# Patient Record
Sex: MALE | Race: WHITE | Employment: UNEMPLOYED | URBAN - METROPOLITAN AREA
[De-identification: names, ages, dates, MRNs, and addresses within clinical notes are randomized per-mention and may not be internally consistent; named-entity substitution may affect disease eponyms.]

---

## 2018-03-09 ENCOUNTER — HOSPITAL ENCOUNTER (EMERGENCY)
Age: 23
Discharge: HOME | End: 2018-03-09
Attending: EMERGENCY MEDICINE
Payer: SELF-PAY

## 2018-03-09 VITALS
BODY MASS INDEX: 29.2 KG/M2 | WEIGHT: 192.68 LBS | RESPIRATION RATE: 14 BRPM | TEMPERATURE: 97.8 F | HEIGHT: 68 IN | DIASTOLIC BLOOD PRESSURE: 76 MMHG | SYSTOLIC BLOOD PRESSURE: 111 MMHG | OXYGEN SATURATION: 100 % | HEART RATE: 102 BPM

## 2018-03-09 DIAGNOSIS — L02.31 ABSCESS OF BUTTOCK, LEFT: Primary | ICD-10-CM

## 2018-03-09 PROCEDURE — 99283 EMERGENCY DEPT VISIT LOW MDM: CPT

## 2018-03-09 PROCEDURE — 74011250637 HC RX REV CODE- 250/637: Performed by: EMERGENCY MEDICINE

## 2018-03-09 RX ORDER — OXYCODONE AND ACETAMINOPHEN 5; 325 MG/1; MG/1
1 TABLET ORAL
Qty: 15 TAB | Refills: 0 | Status: SHIPPED | OUTPATIENT
Start: 2018-03-09 | End: 2021-09-05

## 2018-03-09 RX ORDER — OXYCODONE AND ACETAMINOPHEN 5; 325 MG/1; MG/1
2 TABLET ORAL
Status: COMPLETED | OUTPATIENT
Start: 2018-03-09 | End: 2018-03-09

## 2018-03-09 RX ORDER — DOXYCYCLINE HYCLATE 100 MG
100 TABLET ORAL 2 TIMES DAILY
Qty: 20 TAB | Refills: 0 | Status: SHIPPED | OUTPATIENT
Start: 2018-03-09 | End: 2018-03-09

## 2018-03-09 RX ORDER — DOXYCYCLINE HYCLATE 100 MG
100 TABLET ORAL 2 TIMES DAILY
Qty: 20 TAB | Refills: 0 | Status: SHIPPED | OUTPATIENT
Start: 2018-03-09 | End: 2018-03-19

## 2018-03-09 RX ADMIN — OXYCODONE HYDROCHLORIDE AND ACETAMINOPHEN 2 TABLET: 5; 325 TABLET ORAL at 03:37

## 2018-03-09 NOTE — ED NOTES
______Era__________________ in to talk with patient and explain plan of care with  understanding and  written & verbal instructions.

## 2018-03-09 NOTE — ED NOTES
Patient arrives ambulatory to ED with girlfriend with a report of an abscess to buttocks and \"bug bites\" all over mostly down legs and inner thighs. Patient reports this started about 1-2 weeks ago.

## 2018-03-09 NOTE — ED PROVIDER NOTES
EMERGENCY DEPARTMENT HISTORY AND PHYSICAL EXAM      Date: 3/9/2018  Patient Name: Mily Barreto    History of Presenting Illness     Chief Complaint   Patient presents with    Abscess     Patient has an abscess (hx of MRSA) on the left side of his buttocks that recurrently breaks open and pus mixed with blood comes out. He thinks he is getting bug bites in his house (does not know if it is bed bugs or some other bug). He has not seen any and no one else in the house is getting bit. History Provided By: Patient    HPI: Mily Barreto, 25 y.o. male with PMHx significant for asthma and MRSA, presents ambulatory to the ED for further evaluation of an abscess to the left buttock noticed 1.5 weeks ago. The pt reports that over the last 2-3 days the wound has popped and been draining purulent and bloody material. He expresses that he believes he was bit by bugs and notes that he has numerous bites to his BL legs and buttock region. He discloses a h/o MRSA. The pt expresses that he has been using Neosporin and covering the wound with a band-aid WNR of his sx leading him to the ED. He denies any fevers, chills, chest pain, SOB, abdominal pain, nausea, vomiting, or diarrhea. PCP: Ling Wetzel MD    There are no other complaints, changes, or physical findings at this time.         Past History     Past Medical History:  Past Medical History:   Diagnosis Date    Asthma     BMI (body mass index), pediatric, 95-99% for age 7/17/2013    Hx MRSA infection     Otitis media     [facility]al meningitis 1996    [facility]       Past Surgical History:  Past Surgical History:   Procedure Laterality Date    HX CIRCUMCISION      HX WISDOM TEETH EXTRACTION  2013       Family History:  Family History   Problem Relation Age of Onset    Diabetes Mother     Hypertension Maternal Grandmother     Cancer Maternal Grandmother      breast    Heart Disease Maternal Grandmother        Social History:  Social History   Substance Use Topics    Smoking status: Not on file    Smokeless tobacco: Not on file    Alcohol use Not on file       Allergies: Allergies   Allergen Reactions    Amoxicillin Swelling    Amoxicillin Hives    Codeine Swelling    Codeine Swelling    Sulfa (Sulfonamide Antibiotics) Hives     Happened when pt was an infant         Review of Systems   Review of Systems   Constitutional: Negative for activity change, chills and fever. HENT: Negative for congestion and sore throat. Eyes: Negative for pain and redness. Respiratory: Negative for cough, chest tightness and shortness of breath. Cardiovascular: Negative for chest pain and palpitations. Gastrointestinal: Negative for abdominal pain, diarrhea, nausea and vomiting. Genitourinary: Negative for dysuria, frequency and urgency. Musculoskeletal: Negative for back pain and neck pain. Skin: Positive for wound (abscess to right buttock ). Negative for rash. Neurological: Negative for syncope, light-headedness and headaches. Psychiatric/Behavioral: Negative for confusion. All other systems reviewed and are negative. Physical Exam   Physical Exam   Constitutional: He is oriented to person, place, and time. He appears well-developed and well-nourished. No distress. HENT:   Head: Normocephalic. Nose: Nose normal.   Mouth/Throat: Oropharynx is clear and moist. No oropharyngeal exudate. Eyes: Conjunctivae are normal. Pupils are equal, round, and reactive to light. No scleral icterus. Neck: Normal range of motion. Neck supple. No JVD present. No tracheal deviation present. No thyromegaly present. Cardiovascular: Normal rate, regular rhythm and intact distal pulses. Exam reveals no gallop and no friction rub. No murmur heard. Pulmonary/Chest: Effort normal and breath sounds normal. No stridor. No respiratory distress. He has no wheezes. He has no rales. Abdominal: Soft. Bowel sounds are normal. He exhibits no distension.  There is no tenderness. There is no rebound and no guarding. Musculoskeletal: Normal range of motion. He exhibits no edema. Lymphadenopathy:     He has no cervical adenopathy. Neurological: He is alert and oriented to person, place, and time. No cranial nerve deficit. He exhibits normal muscle tone. Coordination normal.   Skin: Skin is warm and dry. No rash noted. He is not diaphoretic. No erythema. 4cm in diameter swollen area on the superior right buttock just lateral to the buttock crease with a 3mm central open area    Psychiatric: He has a normal mood and affect. His behavior is normal.   Nursing note and vitals reviewed. Diagnostic Study Results       Medical Decision Making   I am the first provider for this patient. I reviewed the vital signs, available nursing notes, past medical history, past surgical history, family history and social history. Vital Signs-Reviewed the patient's vital signs. Patient Vitals for the past 12 hrs:   Temp Pulse Resp BP SpO2   03/09/18 0249 97.8 °F (36.6 °C) (!) 102 14 111/76 100 %       Pulse Oximetry Analysis - 100% on room air    Cardiac Monitor:   Rate: 102 bpm  Rhythm: Sinus Tachycardia        Records Reviewed: Nursing Notes and Old Medical Records    Provider Notes (Medical Decision Making):     DDx: Abscess, Cellulitis. ED Course:   Initial assessment performed. The patients presenting problems have been discussed, and they are in agreement with the care plan formulated and outlined with them. I have encouraged them to ask questions as they arise throughout their visit. Progress Notes:    Procedure Note - Incision and Drainage:   2:56 AM  Performed by: Gina Dempsey MD  Complexity: simple  Direct pressure was applied to abscess to buttocks and 10mLs of yellow-purulent drainage was expressed. Wound probed and irrigated. Area was no packed. Sterile dressing applied. Estimated blood loss: 10 cc  The procedure took 1-15 minutes, and pt tolerated well. Critical Care Time: 0 minutes    Disposition:  Discharge Note:  3:02 AM  The patient is ready for discharge. The patient's signs, symptoms, diagnosis, and discharge instruction have been discussed and the patient has conveyed their understanding. The patient is to follow up as recommended or return to the ER should their symptoms worsen. Plan has been discussed and the patient is in agreement. Written by Flavia Lora ED Scribe, as dictated by Kike Palma MD    Pt well appearing; afebrile; abscess already open and draining; purulent drainage expressed with direct pressure; dc with doxy; pt agreed to return if any worsening pain or swelling, fevers, n/v. Kike Palma MD        PLAN:  1. Current Discharge Medication List      START taking these medications    Details   oxyCODONE-acetaminophen (PERCOCET) 5-325 mg per tablet Take 1 Tab by mouth every four (4) hours as needed for Pain. Max Daily Amount: 6 Tabs. Qty: 15 Tab, Refills: 0    Associated Diagnoses: Abscess of buttock, left      doxycycline (VIBRA-TABS) 100 mg tablet Take 1 Tab by mouth two (2) times a day for 10 days. Qty: 20 Tab, Refills: 0           2. Follow-up Information     Follow up With Details Comments Contact Info    Mario Jenkins MD Schedule an appointment as soon as possible for a visit in 1 day If symptoms worsen [address] [address] [phone] [address] [place] [address] EMERGENCY DEPT Go in 1 day If symptoms worsen [address] [place] [address]  [phone]        Return to ED if worse     Diagnosis     Clinical Impression:   1. Abscess of buttock, left        Attestations:    Attestation: This note is prepared by Robin Lora, acting as Scribe for Kike Palma MD.      Kike Palma MD: The scribe's documentation has been prepared under my direction and personally reviewed by me in its entirety.  I confirm that the note above accurately reflects all work, treatment, procedures, and medical decision making performed by me.

## 2021-04-07 ENCOUNTER — APPOINTMENT (OUTPATIENT)
Dept: GENERAL RADIOLOGY | Age: 26
End: 2021-04-07
Attending: EMERGENCY MEDICINE
Payer: COMMERCIAL

## 2021-04-07 ENCOUNTER — HOSPITAL ENCOUNTER (EMERGENCY)
Age: 26
Discharge: HOME | End: 2021-04-07
Attending: EMERGENCY MEDICINE
Payer: COMMERCIAL

## 2021-04-07 VITALS
TEMPERATURE: 98.9 F | HEART RATE: 93 BPM | OXYGEN SATURATION: 98 % | SYSTOLIC BLOOD PRESSURE: 128 MMHG | DIASTOLIC BLOOD PRESSURE: 74 MMHG | RESPIRATION RATE: 16 BRPM | BODY MASS INDEX: 27.28 KG/M2 | HEIGHT: 68 IN | WEIGHT: 180 LBS

## 2021-04-07 DIAGNOSIS — S39.012A BACK STRAIN, INITIAL ENCOUNTER: ICD-10-CM
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
## 2021-04-07 DIAGNOSIS — S16.1XXA STRAIN OF NECK MUSCLE, INITIAL ENCOUNTER: ICD-10-CM

## 2021-04-07 PROCEDURE — 99283 EMERGENCY DEPT VISIT LOW MDM: CPT

## 2021-04-07 PROCEDURE — 74011250636 HC RX REV CODE- 250/636: Performed by: EMERGENCY MEDICINE

## 2021-04-07 PROCEDURE — 96372 THER/PROPH/DIAG INJ SC/IM: CPT

## 2021-04-07 PROCEDURE — 74011250637 HC RX REV CODE- 250/637: Performed by: EMERGENCY MEDICINE

## 2021-04-07 PROCEDURE — 71045 X-RAY EXAM CHEST 1 VIEW: CPT

## 2021-04-07 RX ORDER — DIAZEPAM 5 MG/1
5 TABLET ORAL
Status: COMPLETED | OUTPATIENT
Start: 2021-04-07 | End: 2021-04-07

## 2021-04-07 RX ORDER — CYCLOBENZAPRINE HCL 10 MG
10 TABLET ORAL
Qty: 20 TAB | Refills: 0 | Status: SHIPPED | OUTPATIENT
Start: 2021-04-07 | End: 2021-09-05

## 2021-04-07 RX ORDER — IBUPROFEN 800 MG/1
800 TABLET ORAL
Qty: 20 TAB | Refills: 0 | Status: SHIPPED | OUTPATIENT
Start: 2021-04-07 | End: 2021-09-05

## 2021-04-07 RX ORDER — KETOROLAC TROMETHAMINE 30 MG/ML
30 INJECTION, SOLUTION INTRAMUSCULAR; INTRAVENOUS
Status: COMPLETED | OUTPATIENT
Start: 2021-04-07 | End: 2021-04-07

## 2021-04-07 RX ADMIN — KETOROLAC TROMETHAMINE 30 MG: 30 INJECTION, SOLUTION INTRAMUSCULAR; INTRAVENOUS at 04:35

## 2021-04-07 RX ADMIN — DIAZEPAM 5 MG: 5 TABLET ORAL at 04:34
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
## 2021-09-03 ENCOUNTER — APPOINTMENT (OUTPATIENT)
Dept: GENERAL RADIOLOGY | Age: 26
DRG: 872 | End: 2021-09-03
Attending: EMERGENCY MEDICINE
Payer: COMMERCIAL

## 2021-09-03 ENCOUNTER — HOSPITAL ENCOUNTER (EMERGENCY)
Age: 26
Discharge: HOME | DRG: 872 | End: 2021-09-03
Attending: EMERGENCY MEDICINE
Payer: COMMERCIAL

## 2021-09-03 VITALS
WEIGHT: 185 LBS | OXYGEN SATURATION: 98 % | SYSTOLIC BLOOD PRESSURE: 116 MMHG | HEART RATE: 92 BPM | TEMPERATURE: 100.3 F | BODY MASS INDEX: 28.13 KG/M2 | DIASTOLIC BLOOD PRESSURE: 64 MMHG | RESPIRATION RATE: 16 BRPM

## 2021-09-03 DIAGNOSIS — N30.00 ACUTE CYSTITIS WITHOUT HEMATURIA: ICD-10-CM

## 2021-09-03 DIAGNOSIS — R11.2 NON-INTRACTABLE VOMITING WITH NAUSEA, UNSPECIFIED VOMITING TYPE: ICD-10-CM

## 2021-09-03 DIAGNOSIS — F11.93 HEROIN WITHDRAWAL (HCC): Primary | ICD-10-CM

## 2021-09-03 DIAGNOSIS — R50.9 ACUTE FEBRILE ILLNESS: ICD-10-CM

## 2021-09-03 LAB
ALBUMIN SERPL-MCNC: 2.4 G/DL (ref 3.5–5)
ALBUMIN/GLOB SERPL: 0.6 {RATIO} (ref 1.1–2.2)
ALP SERPL-CCNC: 74 U/L (ref 45–117)
ALT SERPL-CCNC: 68 U/L (ref 12–78)
AMPHET UR QL SCN: NEGATIVE
ANION GAP SERPL CALC-SCNC: 7 MMOL/L (ref 5–15)
APPEARANCE UR: CLEAR
AST SERPL-CCNC: 57 U/L (ref 15–37)
BACTERIA URNS QL MICRO: NEGATIVE /HPF
BARBITURATES UR QL SCN: NEGATIVE
BASOPHILS # BLD: 0 K/UL (ref 0–0.1)
BASOPHILS NFR BLD: 0 % (ref 0–1)
BENZODIAZ UR QL: NEGATIVE
BILIRUB SERPL-MCNC: 0.9 MG/DL (ref 0.2–1)
BILIRUB UR QL: NEGATIVE
BUN SERPL-MCNC: 7 MG/DL (ref 6–20)
BUN/CREAT SERPL: 7 (ref 12–20)
CALCIUM SERPL-MCNC: 8.3 MG/DL (ref 8.5–10.1)
CANNABINOIDS UR QL SCN: NEGATIVE
CHLORIDE SERPL-SCNC: 92 MMOL/L (ref 97–108)
CO2 SERPL-SCNC: 31 MMOL/L (ref 21–32)
COCAINE UR QL SCN: POSITIVE
COLOR UR: ABNORMAL
CREAT SERPL-MCNC: 1.04 MG/DL (ref 0.7–1.3)
DIFFERENTIAL METHOD BLD: ABNORMAL
DRUG SCRN COMMENT,DRGCM: ABNORMAL
EOSINOPHIL # BLD: 0 K/UL (ref 0–0.4)
EOSINOPHIL NFR BLD: 0 % (ref 0–7)
EPITH CASTS URNS QL MICRO: ABNORMAL /LPF
ERYTHROCYTE [DISTWIDTH] IN BLOOD BY AUTOMATED COUNT: 13.5 % (ref 11.5–14.5)
GLOBULIN SER CALC-MCNC: 4.3 G/DL (ref 2–4)
GLUCOSE SERPL-MCNC: 184 MG/DL (ref 65–100)
GLUCOSE UR STRIP.AUTO-MCNC: NEGATIVE MG/DL
HCT VFR BLD AUTO: 34.5 % (ref 36.6–50.3)
HGB BLD-MCNC: 11.3 G/DL (ref 12.1–17)
HGB UR QL STRIP: ABNORMAL
IMM GRANULOCYTES # BLD AUTO: 0 K/UL
IMM GRANULOCYTES NFR BLD AUTO: 0 %
KETONES UR QL STRIP.AUTO: NEGATIVE MG/DL
LACTATE SERPL-SCNC: 2.6 MMOL/L (ref 0.4–2)
LEUKOCYTE ESTERASE UR QL STRIP.AUTO: ABNORMAL
LYMPHOCYTES # BLD: 1.3 K/UL (ref 0.8–3.5)
LYMPHOCYTES NFR BLD: 10 % (ref 12–49)
MCH RBC QN AUTO: 27.4 PG (ref 26–34)
MCHC RBC AUTO-ENTMCNC: 32.8 G/DL (ref 30–36.5)
MCV RBC AUTO: 83.7 FL (ref 80–99)
METHADONE UR QL: NEGATIVE
MONOCYTES # BLD: 1.7 K/UL (ref 0–1)
MONOCYTES NFR BLD: 13 % (ref 5–13)
MUCOUS THREADS URNS QL MICRO: ABNORMAL /LPF
NEUTS BAND NFR BLD MANUAL: 5 % (ref 0–6)
NEUTS SEG # BLD: 10 K/UL (ref 1.8–8)
NEUTS SEG NFR BLD: 72 % (ref 32–75)
NITRITE UR QL STRIP.AUTO: NEGATIVE
NRBC # BLD: 0 K/UL (ref 0–0.01)
NRBC BLD-RTO: 0 PER 100 WBC
OPIATES UR QL: NEGATIVE
PCP UR QL: NEGATIVE
PH UR STRIP: 6.5 [PH] (ref 5–8)
PLATELET # BLD AUTO: 155 K/UL (ref 150–400)
PMV BLD AUTO: 10 FL (ref 8.9–12.9)
POTASSIUM SERPL-SCNC: 3.4 MMOL/L (ref 3.5–5.1)
PROT SERPL-MCNC: 6.7 G/DL (ref 6.4–8.2)
PROT UR STRIP-MCNC: 30 MG/DL
RBC # BLD AUTO: 4.12 M/UL (ref 4.1–5.7)
RBC #/AREA URNS HPF: ABNORMAL /HPF (ref 0–5)
RBC MORPH BLD: ABNORMAL
SODIUM SERPL-SCNC: 130 MMOL/L (ref 136–145)
SP GR UR REFRACTOMETRY: 1.01 (ref 1–1.03)
TROPONIN I SERPL-MCNC: <0.05 NG/ML
UA: UC IF INDICATED,UAUC: ABNORMAL
UROBILINOGEN UR QL STRIP.AUTO: 4 EU/DL (ref 0.2–1)
WBC # BLD AUTO: 13 K/UL (ref 4.1–11.1)
WBC URNS QL MICRO: ABNORMAL /HPF (ref 0–4)

## 2021-09-03 PROCEDURE — 71045 X-RAY EXAM CHEST 1 VIEW: CPT

## 2021-09-03 PROCEDURE — 36415 COLL VENOUS BLD VENIPUNCTURE: CPT

## 2021-09-03 PROCEDURE — 80053 COMPREHEN METABOLIC PANEL: CPT

## 2021-09-03 PROCEDURE — U0003 INFECTIOUS AGENT DETECTION BY NUCLEIC ACID (DNA OR RNA); SEVERE ACUTE RESPIRATORY SYNDROME CORONAVIRUS 2 (SARS-COV-2) (CORONAVIRUS DISEASE [COVID-19]), AMPLIFIED PROBE TECHNIQUE, MAKING USE OF HIGH THROUGHPUT TECHNOLOGIES AS DESCRIBED BY CMS-2020-01-R: HCPCS

## 2021-09-03 PROCEDURE — 81001 URINALYSIS AUTO W/SCOPE: CPT

## 2021-09-03 PROCEDURE — 87186 SC STD MICRODIL/AGAR DIL: CPT

## 2021-09-03 PROCEDURE — 96375 TX/PRO/DX INJ NEW DRUG ADDON: CPT

## 2021-09-03 PROCEDURE — 85025 COMPLETE CBC W/AUTO DIFF WBC: CPT

## 2021-09-03 PROCEDURE — 96365 THER/PROPH/DIAG IV INF INIT: CPT

## 2021-09-03 PROCEDURE — 87077 CULTURE AEROBIC IDENTIFY: CPT

## 2021-09-03 PROCEDURE — 99285 EMERGENCY DEPT VISIT HI MDM: CPT

## 2021-09-03 PROCEDURE — 80307 DRUG TEST PRSMV CHEM ANLYZR: CPT

## 2021-09-03 PROCEDURE — 84484 ASSAY OF TROPONIN QUANT: CPT

## 2021-09-03 PROCEDURE — 83605 ASSAY OF LACTIC ACID: CPT

## 2021-09-03 PROCEDURE — 74011250636 HC RX REV CODE- 250/636: Performed by: EMERGENCY MEDICINE

## 2021-09-03 PROCEDURE — 87086 URINE CULTURE/COLONY COUNT: CPT

## 2021-09-03 PROCEDURE — 93005 ELECTROCARDIOGRAM TRACING: CPT

## 2021-09-03 PROCEDURE — 74011250637 HC RX REV CODE- 250/637: Performed by: EMERGENCY MEDICINE

## 2021-09-03 RX ORDER — KETOROLAC TROMETHAMINE 30 MG/ML
15 INJECTION, SOLUTION INTRAMUSCULAR; INTRAVENOUS
Status: COMPLETED | OUTPATIENT
Start: 2021-09-03 | End: 2021-09-03

## 2021-09-03 RX ORDER — ONDANSETRON 4 MG/1
4 TABLET, ORALLY DISINTEGRATING ORAL
Qty: 10 TABLET | Refills: 0 | Status: SHIPPED | OUTPATIENT
Start: 2021-09-03 | End: 2021-09-05

## 2021-09-03 RX ORDER — CIPROFLOXACIN 500 MG/1
500 TABLET ORAL 2 TIMES DAILY
Qty: 14 TABLET | Refills: 0 | Status: SHIPPED | OUTPATIENT
Start: 2021-09-03 | End: 2021-09-10

## 2021-09-03 RX ORDER — LEVOFLOXACIN 5 MG/ML
500 INJECTION, SOLUTION INTRAVENOUS
Status: COMPLETED | OUTPATIENT
Start: 2021-09-03 | End: 2021-09-03

## 2021-09-03 RX ORDER — ONDANSETRON 2 MG/ML
4 INJECTION INTRAMUSCULAR; INTRAVENOUS
Status: COMPLETED | OUTPATIENT
Start: 2021-09-03 | End: 2021-09-03

## 2021-09-03 RX ORDER — POTASSIUM CHLORIDE 750 MG/1
40 TABLET, FILM COATED, EXTENDED RELEASE ORAL
Status: COMPLETED | OUTPATIENT
Start: 2021-09-03 | End: 2021-09-03

## 2021-09-03 RX ORDER — SODIUM CHLORIDE 0.9 % (FLUSH) 0.9 %
5-40 SYRINGE (ML) INJECTION EVERY 8 HOURS
Status: DISCONTINUED | OUTPATIENT
Start: 2021-09-03 | End: 2021-09-03 | Stop reason: HOSPADM

## 2021-09-03 RX ADMIN — SODIUM CHLORIDE 1000 ML: 9 INJECTION, SOLUTION INTRAVENOUS at 17:34

## 2021-09-03 RX ADMIN — SODIUM CHLORIDE 1000 ML: 9 INJECTION, SOLUTION INTRAVENOUS at 16:45

## 2021-09-03 RX ADMIN — Medication 10 ML: at 17:51

## 2021-09-03 RX ADMIN — KETOROLAC TROMETHAMINE 15 MG: 30 INJECTION, SOLUTION INTRAMUSCULAR; INTRAVENOUS at 16:43

## 2021-09-03 RX ADMIN — POTASSIUM CHLORIDE 40 MEQ: 750 TABLET, EXTENDED RELEASE ORAL at 17:55

## 2021-09-03 RX ADMIN — ONDANSETRON 4 MG: 2 INJECTION INTRAMUSCULAR; INTRAVENOUS at 16:40

## 2021-09-03 RX ADMIN — LEVOFLOXACIN 500 MG: 5 INJECTION, SOLUTION INTRAVENOUS at 17:36

## 2021-09-03 NOTE — ED PROVIDER NOTES
EMERGENCY DEPARTMENT HISTORY AND PHYSICAL EXAM      Date: 9/3/2021  Patient Name: Gwen Greene    History of Presenting Illness     Chief Complaint   Patient presents with    Vomiting       History Provided By: Patient    HPI: Gwen Greene, 23 y.o. male with PMHx significant for asthma, presents ambulatory to the ED with cc of mild to moderate body aches, chest pain, SOB, NV and dry cough x a week. Pt reports decreased appetite. Reports body aches mostly in his thighs. States chest pain radiates to R shoulder. Denies any alleviating or exacerbating factors. Reports heroin use, last used a few days ago, denies alcohol use. Has not received COVID vaccine and no known COVID exposure. There are no other complaints, changes, or physical findings at this time. PCP: Mila Lu MD    No current facility-administered medications on file prior to encounter. Current Outpatient Medications on File Prior to Encounter   Medication Sig Dispense Refill    ibuprofen (MOTRIN) 800 mg tablet Take 1 Tab by mouth every six (6) hours as needed for Pain. (Patient not taking: Reported on 9/3/2021) 20 Tab 0    cyclobenzaprine (FLEXERIL) 10 mg tablet Take 1 Tab by mouth three (3) times daily as needed for Muscle Spasm(s). (Patient not taking: Reported on 9/3/2021) 20 Tab 0    oxyCODONE-acetaminophen (PERCOCET) 5-325 mg per tablet Take 1 Tab by mouth every four (4) hours as needed for Pain. Max Daily Amount: 6 Tabs.  (Patient not taking: Reported on 9/3/2021) 15 Tab 0       Past History     Past Medical History:  Past Medical History:   Diagnosis Date    Asthma     BMI (body mass index), pediatric, 95-99% for age 7/17/2013    BMI (body mass index), pediatric, 95-99% for age    Alexandra Latin Hx MRSA infection     Otitis media     [facility] 1996    [facility]       Past Surgical History:  Past Surgical History:   Procedure Laterality Date    HX CIRCUMCISION      HX WISDOM TEETH EXTRACTION  2013       Family History:  Family History   Problem Relation Age of Onset    Diabetes Mother     Hypertension Maternal Grandmother     Cancer Maternal Grandmother         breast    Heart Disease Maternal Grandmother        Social History:  Social History     Tobacco Use    Smoking status: Current Every Day Smoker     Packs/day: 0.50    Smokeless tobacco: Never Used   Substance Use Topics    Alcohol use: Not Currently    Drug use: Yes     Types: Heroin       Allergies: Allergies   Allergen Reactions    Amoxicillin Swelling    Amoxicillin Hives    Codeine Swelling    Codeine Swelling    Sulfa (Sulfonamide Antibiotics) Hives     Happened when pt was an infant         Review of Systems   Review of Systems   Constitutional: Negative for chills, fatigue and fever. HENT: Negative. Negative for sore throat. Eyes: Negative. Respiratory: Positive for cough and shortness of breath. Cardiovascular: Positive for chest pain. Negative for palpitations. Gastrointestinal: Positive for nausea and vomiting. Negative for abdominal pain. Genitourinary: Negative for dysuria. Musculoskeletal: Positive for myalgias. Skin: Negative. Neurological: Negative for dizziness, weakness, light-headedness and headaches. Psychiatric/Behavioral: Negative. All other systems reviewed and are negative. Physical Exam   Physical Exam  Vitals and nursing note reviewed. Constitutional:       Appearance: He is well-developed. He is ill-appearing. HENT:      Head: Normocephalic and atraumatic. Eyes:      Conjunctiva/sclera: Conjunctivae normal.      Pupils: Pupils are equal, round, and reactive to light. Cardiovascular:      Rate and Rhythm: Regular rhythm. Tachycardia present. Heart sounds: Normal heart sounds. Pulmonary:      Effort: Pulmonary effort is normal. No respiratory distress. Breath sounds: Rhonchi present. No wheezing. Abdominal:      General: Bowel sounds are normal. There is no distension. Palpations: Abdomen is soft. Tenderness: There is no abdominal tenderness. Musculoskeletal:         General: No tenderness. Normal range of motion. Cervical back: Normal range of motion and neck supple. Skin:     General: Skin is warm and dry. Findings: No rash. Neurological:      Mental Status: He is alert and oriented to person, place, and time. Cranial Nerves: No cranial nerve deficit. Psychiatric:         Behavior: Behavior normal.         Diagnostic Study Results     Labs -     Recent Results (from the past 12 hour(s))   CBC WITH AUTOMATED DIFF    Collection Time: 09/03/21  3:59 PM   Result Value Ref Range    WBC 13.0 (H) 4.1 - 11.1 K/uL    RBC 4.12 4.10 - 5.70 M/uL    HGB 11.3 (L) 12.1 - 17.0 g/dL    HCT 34.5 (L) 36.6 - 50.3 %    MCV 83.7 80.0 - 99.0 FL    MCH 27.4 26.0 - 34.0 PG    MCHC 32.8 30.0 - 36.5 g/dL    RDW 13.5 11.5 - 14.5 %    PLATELET 561 581 - 807 K/uL    MPV 10.0 8.9 - 12.9 FL    NRBC 0.0 0  WBC    ABSOLUTE NRBC 0.00 0.00 - 0.01 K/uL    NEUTROPHILS 72 32 - 75 %    BAND NEUTROPHILS 5 0 - 6 %    LYMPHOCYTES 10 (L) 12 - 49 %    MONOCYTES 13 5 - 13 %    EOSINOPHILS 0 0 - 7 %    BASOPHILS 0 0 - 1 %    IMMATURE GRANULOCYTES 0 %    ABS. NEUTROPHILS 10.0 (H) 1.8 - 8.0 K/UL    ABS. LYMPHOCYTES 1.3 0.8 - 3.5 K/UL    ABS. MONOCYTES 1.7 (H) 0.0 - 1.0 K/UL    ABS. EOSINOPHILS 0.0 0.0 - 0.4 K/UL    ABS. BASOPHILS 0.0 0.0 - 0.1 K/UL    ABS. IMM.  GRANS. 0.0 K/UL    DF MANUAL      RBC COMMENTS NORMOCYTIC, NORMOCHROMIC     METABOLIC PANEL, COMPREHENSIVE    Collection Time: 09/03/21  3:59 PM   Result Value Ref Range    Sodium 130 (L) 136 - 145 mmol/L    Potassium 3.4 (L) 3.5 - 5.1 mmol/L    Chloride 92 (L) 97 - 108 mmol/L    CO2 31 21 - 32 mmol/L    Anion gap 7 5 - 15 mmol/L    Glucose 184 (H) 65 - 100 mg/dL    BUN 7 6 - 20 MG/DL    Creatinine 1.04 0.70 - 1.30 MG/DL    BUN/Creatinine ratio 7 (L) 12 - 20      GFR est AA >60 >60 ml/min/1.73m2    GFR est non-AA >60 >60 ml/min/1.73m2 Calcium 8.3 (L) 8.5 - 10.1 MG/DL    Bilirubin, total 0.9 0.2 - 1.0 MG/DL    ALT (SGPT) 68 12 - 78 U/L    AST (SGOT) 57 (H) 15 - 37 U/L    Alk.  phosphatase 74 45 - 117 U/L    Protein, total 6.7 6.4 - 8.2 g/dL    Albumin 2.4 (L) 3.5 - 5.0 g/dL    Globulin 4.3 (H) 2.0 - 4.0 g/dL    A-G Ratio 0.6 (L) 1.1 - 2.2     TROPONIN I    Collection Time: 09/03/21  3:59 PM   Result Value Ref Range    Troponin-I, Qt. <0.05 <0.05 ng/mL   LACTIC ACID    Collection Time: 09/03/21  3:59 PM   Result Value Ref Range    Lactic acid 2.6 (HH) 0.4 - 2.0 MMOL/L   URINALYSIS W/ REFLEX CULTURE    Collection Time: 09/03/21  3:59 PM    Specimen: Urine   Result Value Ref Range    Color YELLOW/STRAW      Appearance CLEAR CLEAR      Specific gravity 1.010 1.003 - 1.030      pH (UA) 6.5 5.0 - 8.0      Protein 30 (A) NEG mg/dL    Glucose Negative NEG mg/dL    Ketone Negative NEG mg/dL    Bilirubin Negative NEG      Blood SMALL (A) NEG      Urobilinogen 4.0 (H) 0.2 - 1.0 EU/dL    Nitrites Negative NEG      Leukocyte Esterase SMALL (A) NEG      WBC 5-10 0 - 4 /hpf    RBC 5-10 0 - 5 /hpf    Epithelial cells FEW FEW /lpf    Bacteria Negative NEG /hpf    UA:UC IF INDICATED CULTURE NOT INDICATED BY UA RESULT CNI      Mucus 1+ (A) NEG /lpf   DRUG SCREEN, URINE    Collection Time: 09/03/21  3:59 PM   Result Value Ref Range    AMPHETAMINES Negative NEG      BARBITURATES Negative NEG      BENZODIAZEPINES Negative NEG      COCAINE Positive (A) NEG      METHADONE Negative NEG      OPIATES Negative NEG      PCP(PHENCYCLIDINE) Negative NEG      THC (TH-CANNABINOL) Negative NEG      Drug screen comment (NOTE)    EKG, 12 LEAD, INITIAL    Collection Time: 09/03/21  4:02 PM   Result Value Ref Range    Ventricular Rate 108 BPM    Atrial Rate 108 BPM    P-R Interval 138 ms    QRS Duration 110 ms    Q-T Interval 344 ms    QTC Calculation (Bezet) 460 ms    Calculated P Axis 50 degrees    Calculated R Axis 9 degrees    Calculated T Axis 38 degrees    Diagnosis Sinus tachycardia  Otherwise normal ECG  No previous ECGs available         Radiologic Studies -   XR CHEST PORT   Final Result   No acute cardiopulmonary disease radiographically. .  . CT Results  (Last 48 hours)    None        CXR Results  (Last 48 hours)               09/03/21 1652  XR CHEST PORT Final result    Impression:  No acute cardiopulmonary disease radiographically. .  . Narrative:  INDICATION:  chest pain        EXAM: Chest single view. COMPARISON: 4/7/2021. FINDINGS: A single frontal view of the chest at 1647 hours shows clear lungs. The heart, mediastinum and pulmonary vasculature are stable . The bony thorax   is unremarkable for age. .                   Medical Decision Making   I am the first provider for this patient. I reviewed the vital signs, available nursing notes, past medical history, past surgical history, family history and social history. Vital Signs-Reviewed the patient's vital signs. Patient Vitals for the past 12 hrs:   Temp Pulse Resp BP SpO2   09/03/21 1550 -- -- -- -- 98 %   09/03/21 1542 (!) 100.9 °F (38.3 °C) (!) 115 18 130/74 98 %     EKG interpretation: (Preliminary) 1602  Rhythm: sinus tachycardia; and regular . Rate (approx.): 108; Axis: normal; NM interval: normal; QRS interval: normal ; ST/T wave: normal    Records Reviewed: Nursing Notes, Old Medical Records, Previous electrocardiograms, Previous Radiology Studies and Previous Laboratory Studies    Provider Notes (Medical Decision Making):   DDx: febrile illness, PNA, dehydration     ED Course:   Initial assessment performed. The patients presenting problems have been discussed, and they are in agreement with the care plan formulated and outlined with them. I have encouraged them to ask questions as they arise throughout their visit.     CONSULT NOTE:   5:16 Leon Clancy MD spoke with Dr Ethel Guardado ,   Specialty: Hospitalist  Discussed pt's hx, disposition, and available diagnostic and imaging results. Reviewed care plans. Consultant will evaluate pt for admission. 5:17 PM  Spoke with patient regarding admission. Pt states he will not stay. States he will return if he gets worse. Pt aware of risks. Dr Chinmay Leos made aware, does not need to come evaluate patient. Return precautions given. Critical Care Time:   none    Disposition:  DISCHARGE  The patient has been re-evaluated and is ready for discharge. Reviewed available results with patient. Counseled pt on diagnosis and care plan. Pt has expressed understanding, and all questions have been answered. Pt agrees with plan and agrees to follow up as recommended, or return to the ED if their symptoms worsen. Discharge instructions have been provided and explained to the pt, along with reasons to return to the ED. PLAN:  1. Current Discharge Medication List        2. Follow-up Information    None       Return to ED if worse     Diagnosis     Clinical Impression:   1. Heroin withdrawal ([facility] [address]    2. Non-intractable vomiting with nausea, unspecified vomiting type    3. Acute cystitis without hematuria    4. Acute febrile illness        Attestations: This note is prepared by Raji Williamson, acting as Scribe for Emmanuelle Ly MD.    Emmanuelle Ly MD: The scribe's documentation has been prepared under my direction and personally reviewed by me in its entirety. I confirm that the note above accurately reflects all work, treatment, procedures, and medical decision making performed by me.

## 2021-09-03 NOTE — ED NOTES
Bedside and Verbal shift change report given to Nikia Adams RN (oncoming nurse) by Jose Serrano RN and Suzette Orozco RN (offgoing nurse). Report included the following information SBAR, ED Summary, MAR and Recent Results.

## 2021-09-03 NOTE — ED NOTES
Pt reporting generalized body aches, N/V, decreased appetite and night sweats x Monday. Pt reports he uses IV heroin and has been trying to withdrawal so he can get back on Suboxone. Pt reports last use was Sunday or Monday. Pt denies any known exposure to COVID. Pt reports he does not feel like he is going through withdrawal.       Emergency [address] is developed from the Nursing assessment and Emergency Department Attending provider initial evaluation. The plan of care may be reviewed in the ED Provider note.     The Plan of Care was developed with the following considerations:   Patient / Family readiness to learn indicated by:verbalized understanding  Persons(s) to be included in education: patient  Barriers to Learning/Limitations:No    Signed     Shady Jerez RN    9/3/2021   4:49 PM

## 2021-09-04 ENCOUNTER — PATIENT OUTREACH (OUTPATIENT)
Dept: CASE MANAGEMENT | Age: 26
End: 2021-09-04

## 2021-09-04 LAB
ATRIAL RATE: 108 BPM
CALCULATED P AXIS, ECG09: 50 DEGREES
CALCULATED R AXIS, ECG10: 9 DEGREES
CALCULATED T AXIS, ECG11: 38 DEGREES
DIAGNOSIS, 93000: NORMAL
P-R INTERVAL, ECG05: 138 MS
Q-T INTERVAL, ECG07: 344 MS
QRS DURATION, ECG06: 110 MS
QTC CALCULATION (BEZET), ECG08: 460 MS
VENTRICULAR RATE, ECG03: 108 BPM

## 2021-09-05 ENCOUNTER — APPOINTMENT (OUTPATIENT)
Dept: CT IMAGING | Age: 26
DRG: 872 | End: 2021-09-05
Attending: STUDENT IN AN ORGANIZED HEALTH CARE EDUCATION/TRAINING PROGRAM
Payer: COMMERCIAL

## 2021-09-05 ENCOUNTER — HOSPITAL ENCOUNTER (INPATIENT)
Age: 26
LOS: 2 days | Discharge: LEFT BEFORE DISCHARGE | DRG: 872 | End: 2021-09-07
Attending: STUDENT IN AN ORGANIZED HEALTH CARE EDUCATION/TRAINING PROGRAM | Admitting: INTERNAL MEDICINE
Payer: COMMERCIAL

## 2021-09-05 ENCOUNTER — APPOINTMENT (OUTPATIENT)
Dept: GENERAL RADIOLOGY | Age: 26
DRG: 872 | End: 2021-09-05
Attending: STUDENT IN AN ORGANIZED HEALTH CARE EDUCATION/TRAINING PROGRAM
Payer: COMMERCIAL

## 2021-09-05 ENCOUNTER — APPOINTMENT (OUTPATIENT)
Dept: NON INVASIVE DIAGNOSTICS | Age: 26
DRG: 872 | End: 2021-09-05
Attending: STUDENT IN AN ORGANIZED HEALTH CARE EDUCATION/TRAINING PROGRAM
Payer: COMMERCIAL

## 2021-09-05 DIAGNOSIS — R78.81 BACTEREMIA: ICD-10-CM

## 2021-09-05 DIAGNOSIS — I76 SEPTIC EMBOLISM (HCC): Primary | ICD-10-CM

## 2021-09-05 LAB
ALBUMIN SERPL-MCNC: 2.1 G/DL (ref 3.5–5)
ALBUMIN/GLOB SERPL: 0.4 {RATIO} (ref 1.1–2.2)
ALP SERPL-CCNC: 75 U/L (ref 45–117)
ALT SERPL-CCNC: 60 U/L (ref 12–78)
AMPHET UR QL SCN: NEGATIVE
ANION GAP SERPL CALC-SCNC: 2 MMOL/L (ref 5–15)
AST SERPL-CCNC: 51 U/L (ref 15–37)
BARBITURATES UR QL SCN: NEGATIVE
BASOPHILS # BLD: 0.1 K/UL (ref 0–0.1)
BASOPHILS NFR BLD: 1 % (ref 0–1)
BENZODIAZ UR QL: NEGATIVE
BILIRUB SERPL-MCNC: 0.7 MG/DL (ref 0.2–1)
BUN SERPL-MCNC: 8 MG/DL (ref 6–20)
BUN/CREAT SERPL: 8 (ref 12–20)
CALCIUM SERPL-MCNC: 8.2 MG/DL (ref 8.5–10.1)
CANNABINOIDS UR QL SCN: NEGATIVE
CHLORIDE SERPL-SCNC: 98 MMOL/L (ref 97–108)
CO2 SERPL-SCNC: 32 MMOL/L (ref 21–32)
COCAINE UR QL SCN: POSITIVE
CREAT SERPL-MCNC: 1 MG/DL (ref 0.7–1.3)
D DIMER PPP FEU-MCNC: 3.42 MG/L FEU (ref 0–0.65)
DIFFERENTIAL METHOD BLD: ABNORMAL
DRUG SCRN COMMENT,DRGCM: ABNORMAL
ECHO AO ROOT DIAM: 2.47 CM
ECHO AV AREA PLAN: 5.24 CM2
ECHO EST RA PRESSURE: 5 MMHG
ECHO LA AREA 4C: 17.32 CM2
ECHO LA MAJOR AXIS: 3.29 CM
ECHO LA MINOR AXIS: 1.66 CM
ECHO LA VOL 4C: 38.62 ML (ref 18–58)
ECHO LA VOLUME INDEX A4C: 19.51 ML/M2 (ref 16–28)
ECHO LV EDV A4C: 188.54 ML
ECHO LV EDV INDEX A4C: 95.2 ML/M2
ECHO LV EJECTION FRACTION A4C: 67 PERCENT
ECHO LV ESV A4C: 61.36 ML
ECHO LV ESV INDEX A4C: 31 ML/M2
ECHO LV INTERNAL DIMENSION DIASTOLIC: 5.52 CM (ref 4.2–5.9)
ECHO LV INTERNAL DIMENSION SYSTOLIC: 4.04 CM
ECHO LV IVSD: 0.86 CM (ref 0.6–1)
ECHO LV MASS 2D: 188.3 G (ref 88–224)
ECHO LV MASS INDEX 2D: 95.1 G/M2 (ref 49–115)
ECHO LV POSTERIOR WALL DIASTOLIC: 0.95 CM (ref 0.6–1)
ECHO LVOT DIAM: 2.12 CM
ECHO LVOT PEAK GRADIENT: 3.54 MMHG
ECHO LVOT PEAK VELOCITY: 94.04 CM/S
ECHO MV A VELOCITY: 53.53 CM/S
ECHO MV AREA PHT: 3.38 CM2
ECHO MV AREA PHT: 7.35 CM2
ECHO MV AREA PLAN: 10.59 CM2
ECHO MV E DECELERATION TIME (DT): 103.26 MS
ECHO MV E VELOCITY: 57.73 CM/S
ECHO MV E/A RATIO: 1.08
ECHO MV MAX VELOCITY: 76.09 CM/S
ECHO MV MEAN GRADIENT: 1.16 MMHG
ECHO MV PEAK GRADIENT: 2.32 MMHG
ECHO MV PRESSURE HALF TIME (PHT): 29.95 MS
ECHO MV PRESSURE HALF TIME (PHT): 65.15 MS
ECHO MV VTI: 17.55 CM
ECHO PV PEAK INSTANTANEOUS GRADIENT SYSTOLIC: 3.44 MMHG
ECHO PV REGURGITANT MAX VELOCITY: 72.27 CM/S
ECHO RA AREA 4C: 20.4 CM2
ECHO RIGHT VENTRICULAR SYSTOLIC PRESSURE (RVSP): 36.6 MMHG
ECHO TV REGURGITANT MAX VELOCITY: 276.08 CM/S
ECHO TV REGURGITANT PEAK GRADIENT: 31.6 MMHG
EOSINOPHIL # BLD: 0.1 K/UL (ref 0–0.4)
EOSINOPHIL NFR BLD: 1 % (ref 0–7)
ERYTHROCYTE [DISTWIDTH] IN BLOOD BY AUTOMATED COUNT: 14.1 % (ref 11.5–14.5)
GLOBULIN SER CALC-MCNC: 4.8 G/DL (ref 2–4)
GLUCOSE SERPL-MCNC: 143 MG/DL (ref 65–100)
HAV IGM SER QL: REACTIVE
HBV CORE IGM SER QL: NONREACTIVE
HBV SURFACE AG SER QL: <0.1 INDEX
HBV SURFACE AG SER QL: NEGATIVE
HCT VFR BLD AUTO: 34.2 % (ref 36.6–50.3)
HCV AB SER IA-ACNC: >11 INDEX
HCV AB SERPL QL IA: REACTIVE
HGB BLD-MCNC: 11.2 G/DL (ref 12.1–17)
HIV 1+2 AB+HIV1 P24 AG SERPL QL IA: NONREACTIVE
HIV12 RESULT COMMENT, HHIVC: NORMAL
IMM GRANULOCYTES # BLD AUTO: 0.5 K/UL (ref 0–0.04)
IMM GRANULOCYTES NFR BLD AUTO: 4 % (ref 0–0.5)
LACTATE SERPL-SCNC: 0.9 MMOL/L (ref 0.4–2)
LYMPHOCYTES # BLD: 2.4 K/UL (ref 0.8–3.5)
LYMPHOCYTES NFR BLD: 18 % (ref 12–49)
MCH RBC QN AUTO: 27.9 PG (ref 26–34)
MCHC RBC AUTO-ENTMCNC: 32.7 G/DL (ref 30–36.5)
MCV RBC AUTO: 85.3 FL (ref 80–99)
METHADONE UR QL: NEGATIVE
MONOCYTES # BLD: 1.6 K/UL (ref 0–1)
MONOCYTES NFR BLD: 12 % (ref 5–13)
NEUTS SEG # BLD: 8.6 K/UL (ref 1.8–8)
NEUTS SEG NFR BLD: 64 % (ref 32–75)
NRBC # BLD: 0 K/UL (ref 0–0.01)
NRBC BLD-RTO: 0 PER 100 WBC
OPIATES UR QL: NEGATIVE
PCP UR QL: NEGATIVE
PLATELET # BLD AUTO: 215 K/UL (ref 150–400)
PMV BLD AUTO: 10.1 FL (ref 8.9–12.9)
POTASSIUM SERPL-SCNC: 3.5 MMOL/L (ref 3.5–5.1)
PROT SERPL-MCNC: 6.9 G/DL (ref 6.4–8.2)
RBC # BLD AUTO: 4.01 M/UL (ref 4.1–5.7)
RBC MORPH BLD: ABNORMAL
SARS-COV-2, XPLCVT: NOT DETECTED
SODIUM SERPL-SCNC: 132 MMOL/L (ref 136–145)
SOURCE, COVRS: NORMAL
SP1: ABNORMAL
SP2: ABNORMAL
SP3: ABNORMAL
TROPONIN I SERPL-MCNC: <0.05 NG/ML
WBC # BLD AUTO: 13.3 K/UL (ref 4.1–11.1)

## 2021-09-05 PROCEDURE — 80074 ACUTE HEPATITIS PANEL: CPT

## 2021-09-05 PROCEDURE — 65270000032 HC RM SEMIPRIVATE

## 2021-09-05 PROCEDURE — 71275 CT ANGIOGRAPHY CHEST: CPT

## 2021-09-05 PROCEDURE — 74011250636 HC RX REV CODE- 250/636: Performed by: STUDENT IN AN ORGANIZED HEALTH CARE EDUCATION/TRAINING PROGRAM

## 2021-09-05 PROCEDURE — 85379 FIBRIN DEGRADATION QUANT: CPT

## 2021-09-05 PROCEDURE — 85025 COMPLETE CBC W/AUTO DIFF WBC: CPT

## 2021-09-05 PROCEDURE — 74011000636 HC RX REV CODE- 636: Performed by: STUDENT IN AN ORGANIZED HEALTH CARE EDUCATION/TRAINING PROGRAM

## 2021-09-05 PROCEDURE — 96375 TX/PRO/DX INJ NEW DRUG ADDON: CPT

## 2021-09-05 PROCEDURE — 99284 EMERGENCY DEPT VISIT MOD MDM: CPT

## 2021-09-05 PROCEDURE — 96374 THER/PROPH/DIAG INJ IV PUSH: CPT

## 2021-09-05 PROCEDURE — 94760 N-INVAS EAR/PLS OXIMETRY 1: CPT

## 2021-09-05 PROCEDURE — 93005 ELECTROCARDIOGRAM TRACING: CPT

## 2021-09-05 PROCEDURE — 80307 DRUG TEST PRSMV CHEM ANLYZR: CPT

## 2021-09-05 PROCEDURE — 93306 TTE W/DOPPLER COMPLETE: CPT

## 2021-09-05 PROCEDURE — 83605 ASSAY OF LACTIC ACID: CPT

## 2021-09-05 PROCEDURE — 80053 COMPREHEN METABOLIC PANEL: CPT

## 2021-09-05 PROCEDURE — 87389 HIV-1 AG W/HIV-1&-2 AB AG IA: CPT

## 2021-09-05 PROCEDURE — 87040 BLOOD CULTURE FOR BACTERIA: CPT

## 2021-09-05 PROCEDURE — 87077 CULTURE AEROBIC IDENTIFY: CPT

## 2021-09-05 PROCEDURE — 74011250637 HC RX REV CODE- 250/637: Performed by: STUDENT IN AN ORGANIZED HEALTH CARE EDUCATION/TRAINING PROGRAM

## 2021-09-05 PROCEDURE — 71045 X-RAY EXAM CHEST 1 VIEW: CPT

## 2021-09-05 PROCEDURE — 74011000258 HC RX REV CODE- 258: Performed by: STUDENT IN AN ORGANIZED HEALTH CARE EDUCATION/TRAINING PROGRAM

## 2021-09-05 PROCEDURE — 87186 SC STD MICRODIL/AGAR DIL: CPT

## 2021-09-05 PROCEDURE — 74011250636 HC RX REV CODE- 250/636: Performed by: INTERNAL MEDICINE

## 2021-09-05 PROCEDURE — 36415 COLL VENOUS BLD VENIPUNCTURE: CPT

## 2021-09-05 PROCEDURE — 84484 ASSAY OF TROPONIN QUANT: CPT

## 2021-09-05 PROCEDURE — 74011250637 HC RX REV CODE- 250/637: Performed by: INTERNAL MEDICINE

## 2021-09-05 PROCEDURE — 93306 TTE W/DOPPLER COMPLETE: CPT | Performed by: INTERNAL MEDICINE

## 2021-09-05 RX ORDER — ACETAMINOPHEN 650 MG/1
650 SUPPOSITORY RECTAL
Status: DISCONTINUED | OUTPATIENT
Start: 2021-09-05 | End: 2021-09-07 | Stop reason: HOSPADM

## 2021-09-05 RX ORDER — ONDANSETRON 4 MG/1
4 TABLET, ORALLY DISINTEGRATING ORAL
Status: DISCONTINUED | OUTPATIENT
Start: 2021-09-05 | End: 2021-09-07 | Stop reason: HOSPADM

## 2021-09-05 RX ORDER — METRONIDAZOLE 500 MG/1
500 TABLET ORAL EVERY 12 HOURS
Status: DISCONTINUED | OUTPATIENT
Start: 2021-09-05 | End: 2021-09-07 | Stop reason: HOSPADM

## 2021-09-05 RX ORDER — POLYETHYLENE GLYCOL 3350 17 G/17G
17 POWDER, FOR SOLUTION ORAL DAILY PRN
Status: DISCONTINUED | OUTPATIENT
Start: 2021-09-05 | End: 2021-09-07 | Stop reason: HOSPADM

## 2021-09-05 RX ORDER — SODIUM CHLORIDE 0.9 % (FLUSH) 0.9 %
5-40 SYRINGE (ML) INJECTION AS NEEDED
Status: DISCONTINUED | OUTPATIENT
Start: 2021-09-05 | End: 2021-09-07 | Stop reason: HOSPADM

## 2021-09-05 RX ORDER — LOPERAMIDE HYDROCHLORIDE 2 MG/1
2 CAPSULE ORAL AS NEEDED
Status: DISCONTINUED | OUTPATIENT
Start: 2021-09-05 | End: 2021-09-07 | Stop reason: HOSPADM

## 2021-09-05 RX ORDER — LANOLIN ALCOHOL/MO/W.PET/CERES
100 CREAM (GRAM) TOPICAL DAILY
Status: DISCONTINUED | OUTPATIENT
Start: 2021-09-05 | End: 2021-09-07 | Stop reason: HOSPADM

## 2021-09-05 RX ORDER — SODIUM CHLORIDE 0.9 % (FLUSH) 0.9 %
5-40 SYRINGE (ML) INJECTION EVERY 8 HOURS
Status: DISCONTINUED | OUTPATIENT
Start: 2021-09-05 | End: 2021-09-07 | Stop reason: HOSPADM

## 2021-09-05 RX ORDER — KETOROLAC TROMETHAMINE 30 MG/ML
30 INJECTION, SOLUTION INTRAMUSCULAR; INTRAVENOUS
Status: DISCONTINUED | OUTPATIENT
Start: 2021-09-05 | End: 2021-09-07 | Stop reason: HOSPADM

## 2021-09-05 RX ORDER — DIPHENHYDRAMINE HYDROCHLORIDE 50 MG/ML
25 INJECTION, SOLUTION INTRAMUSCULAR; INTRAVENOUS
Status: COMPLETED | OUTPATIENT
Start: 2021-09-05 | End: 2021-09-05

## 2021-09-05 RX ORDER — BUPRENORPHINE AND NALOXONE 8; 2 MG/1; MG/1
1 FILM, SOLUBLE BUCCAL; SUBLINGUAL DAILY
COMMUNITY

## 2021-09-05 RX ORDER — OXYCODONE HYDROCHLORIDE 5 MG/1
5 TABLET ORAL
Status: DISCONTINUED | OUTPATIENT
Start: 2021-09-05 | End: 2021-09-07 | Stop reason: HOSPADM

## 2021-09-05 RX ORDER — KETOROLAC TROMETHAMINE 30 MG/ML
15 INJECTION, SOLUTION INTRAMUSCULAR; INTRAVENOUS
Status: COMPLETED | OUTPATIENT
Start: 2021-09-05 | End: 2021-09-05

## 2021-09-05 RX ORDER — FOLIC ACID 1 MG/1
1 TABLET ORAL DAILY
Status: DISCONTINUED | OUTPATIENT
Start: 2021-09-05 | End: 2021-09-07 | Stop reason: HOSPADM

## 2021-09-05 RX ORDER — ENOXAPARIN SODIUM 100 MG/ML
40 INJECTION SUBCUTANEOUS DAILY
Status: DISCONTINUED | OUTPATIENT
Start: 2021-09-05 | End: 2021-09-07 | Stop reason: HOSPADM

## 2021-09-05 RX ORDER — DICYCLOMINE HYDROCHLORIDE 10 MG/ML
20 INJECTION INTRAMUSCULAR
Status: DISCONTINUED | OUTPATIENT
Start: 2021-09-05 | End: 2021-09-07 | Stop reason: HOSPADM

## 2021-09-05 RX ORDER — DIPHENHYDRAMINE HYDROCHLORIDE 50 MG/ML
INJECTION, SOLUTION INTRAMUSCULAR; INTRAVENOUS
Status: DISPENSED
Start: 2021-09-05 | End: 2021-09-05

## 2021-09-05 RX ORDER — ACETAMINOPHEN 325 MG/1
650 TABLET ORAL
Status: DISCONTINUED | OUTPATIENT
Start: 2021-09-05 | End: 2021-09-07 | Stop reason: HOSPADM

## 2021-09-05 RX ORDER — ONDANSETRON 2 MG/ML
4 INJECTION INTRAMUSCULAR; INTRAVENOUS
Status: DISCONTINUED | OUTPATIENT
Start: 2021-09-05 | End: 2021-09-07 | Stop reason: HOSPADM

## 2021-09-05 RX ORDER — SODIUM CHLORIDE, SODIUM LACTATE, POTASSIUM CHLORIDE, CALCIUM CHLORIDE 600; 310; 30; 20 MG/100ML; MG/100ML; MG/100ML; MG/100ML
100 INJECTION, SOLUTION INTRAVENOUS CONTINUOUS
Status: DISCONTINUED | OUTPATIENT
Start: 2021-09-05 | End: 2021-09-06

## 2021-09-05 RX ADMIN — CEFEPIME HYDROCHLORIDE 2 G: 2 INJECTION, POWDER, FOR SOLUTION INTRAVENOUS at 11:01

## 2021-09-05 RX ADMIN — VANCOMYCIN HYDROCHLORIDE 1250 MG: 1 INJECTION, POWDER, LYOPHILIZED, FOR SOLUTION INTRAVENOUS at 17:40

## 2021-09-05 RX ADMIN — ACETAMINOPHEN 650 MG: 325 TABLET ORAL at 21:38

## 2021-09-05 RX ADMIN — SODIUM CHLORIDE, POTASSIUM CHLORIDE, SODIUM LACTATE AND CALCIUM CHLORIDE 100 ML/HR: 600; 310; 30; 20 INJECTION, SOLUTION INTRAVENOUS at 15:05

## 2021-09-05 RX ADMIN — DIPHENHYDRAMINE HYDROCHLORIDE 25 MG: 50 INJECTION, SOLUTION INTRAMUSCULAR; INTRAVENOUS at 02:46

## 2021-09-05 RX ADMIN — FOLIC ACID 1 MG: 1 TABLET ORAL at 10:20

## 2021-09-05 RX ADMIN — KETOROLAC TROMETHAMINE 15 MG: 30 INJECTION, SOLUTION INTRAMUSCULAR; INTRAVENOUS at 01:07

## 2021-09-05 RX ADMIN — METRONIDAZOLE 500 MG: 500 TABLET ORAL at 23:21

## 2021-09-05 RX ADMIN — Medication 10 ML: at 22:00

## 2021-09-05 RX ADMIN — IOPAMIDOL 100 ML: 755 INJECTION, SOLUTION INTRAVENOUS at 02:41

## 2021-09-05 RX ADMIN — METRONIDAZOLE 500 MG: 500 TABLET ORAL at 11:52

## 2021-09-05 RX ADMIN — ACETAMINOPHEN 650 MG: 325 TABLET ORAL at 14:03

## 2021-09-05 RX ADMIN — Medication 100 MG: at 10:20

## 2021-09-05 RX ADMIN — VANCOMYCIN HYDROCHLORIDE 1000 MG: 1 INJECTION, POWDER, LYOPHILIZED, FOR SOLUTION INTRAVENOUS at 05:51

## 2021-09-05 RX ADMIN — VANCOMYCIN HYDROCHLORIDE 750 MG: 750 INJECTION, POWDER, LYOPHILIZED, FOR SOLUTION INTRAVENOUS at 08:24

## 2021-09-05 RX ADMIN — ENOXAPARIN SODIUM 40 MG: 100 INJECTION SUBCUTANEOUS at 08:31

## 2021-09-05 RX ADMIN — Medication 10 ML: at 15:54

## 2021-09-05 RX ADMIN — SODIUM CHLORIDE, POTASSIUM CHLORIDE, SODIUM LACTATE AND CALCIUM CHLORIDE 150 ML/HR: 600; 310; 30; 20 INJECTION, SOLUTION INTRAVENOUS at 05:00

## 2021-09-05 NOTE — PROGRESS NOTES
[facility] Admission Pharmacy Medication Reconciliation    Information obtained from:  Patient interview, [facility] pharmacy [phone], [facility]  RxQuery data available1: yes    Comments/recommendations:    1) The patient was interviewed regarding current PTA medication list, use and drug allergies. The patient was questioned regarding use of any other inhalers, topical products, over the counter medications, herbal medications, vitamin products or ophthalmic/nasal/otic medication use. 2) Medication changes to PTA list:    Added  ON FILE at [facility] pharmacy: Suboxone 8/2mg SL film - one film SL daily #12 dated 9/2/21 from dr. Jamey Nelson from [address] [place] [address] in [place]  Not yet filled due to insurance not covering brand name order. Patient stated he has not taken any for \"over 3 months\"  Last Suboxone filled was in April 2021  Removed  Cyclobenzaprine 10 mg  Ibuprofen 800 mg  Ondansetron ODT 4mg  Percocet 5/325    3) The [place] Prescription Monitoring Program () was accessed to determine fill history of any controlled medications:  4/14/21 Suboxone 8/2 mg SL film #7r     1RxQuery pharmacy benefit data reflects medications filled and processed through the patient's insurance, however                this data does NOT capture whether the medication was picked up or is currently being taken by the patient. Past Medical History/Disease States:  Past Medical History:   Diagnosis Date    Asthma     BMI (body mass index), pediatric, 95-99% for age 7/17/2013    BMI (body mass index), pediatric, 95-99% for age     Hx MRSA infection     Otitis media     Viral meningitis 1996    [facility]         Patient allergies:    Allergies as of 09/05/2021 - Fully Reviewed 09/05/2021   Allergen Reaction Noted    Amoxicillin Swelling 01/13/2012    Amoxicillin Hives 07/17/2012    Codeine Swelling 01/13/2012    Codeine Swelling 07/17/2012    Sulfa (sulfonamide antibiotics) Hives 07/17/2012         Prior to Admission Medications Prescriptions   Patient Reported? Taking? buprenorphine-naloxone (SUBOXONE) 8-2 mg film sublingaul film   Yes No. RX not yet filled, not yet picked up, not yet started. Issue with coverage by insurance for brand name   Si Film by SubLINGual route daily. ciprofloxacin HCl (Cipro) 500 mg tablet   No Yes   Sig: Take 1 Tablet by mouth two (2) times a day for 7 days.                 Thank you,  Barby Lindsey, [facility]

## 2021-09-05 NOTE — PROGRESS NOTES
HILDA   RUR 9 %     CM assessment in progress    [facility] MS[facility] RN   [phone]

## 2021-09-05 NOTE — ED NOTES
Pt alert and oriented, VSS, in no acute distress, resting in bed with bed in low position and wheels locked, call bell in reach. Toileting needs addressed, denies pain at this time. Will continue to monitor and notify of any changes.

## 2021-09-05 NOTE — ED TRIAGE NOTES
Pt reporting right sided chest pain x yesterday around 1600 with generalized body aches and mild SOB x last ED visit. Reports that we wanted to admit him, but he left AMA. Pt is poor historian.

## 2021-09-05 NOTE — ED NOTES
NOTIFIED BY RADIOLOGY THAT PT RETURNED FROM [facility] AND IS ITCHING. ER MD NOTIFIED, WHO GAVE VO TO ADMIN BENADRYL 25 MG IVP.

## 2021-09-05 NOTE — ED NOTES
SPOKE WITH PHARMACY AT [facility] REGARDING VERIFICATION OF VANCOMYCIN AND LR ORDERS. AWAITING VERIFICATION.

## 2021-09-05 NOTE — ED NOTES
Pt alert and oriented, VSS, in no acute distress, resting in bed with bed in low position and wheels locked, call bell in reach. Toileting needs addressed, denies pain at this time.

## 2021-09-05 NOTE — PROGRESS NOTES
TRANSFER - IN REPORT:    Verbal report received from [facility]) on Yanni Lloyd  being received from ED(unit) for routine progression of care      Report consisted of patients Situation, Background, Assessment and   Recommendations(SBAR). Information from the following report(s) SBAR, Kardex, Intake/Output, MAR, Recent Results, Med Rec Status and Cardiac Rhythm SR was reviewed with the receiving nurse. Opportunity for questions and clarification was provided. Assessment completed upon patients arrival to unit and care assumed. 1525 pt came to floor via stretcher. pt ambulate to his bed. pt awake and alert with steady gait. pt on iv fluid. call bell in reached,pt assessed and continue care assumed. 1620 blood drawn and sent to lab. 1737 urine sample collected and sent to lab.pt has poor appetite at dinner time. 1940 . Bedside and Verbal shift change report given to [address]itesh Smith (oncoming nurse) by Ramone Lama (offgoing nurse). Report included the following information SBAR, Kardex, Intake/Output, MAR, Recent Results, Med Rec Status and Cardiac Rhythm NSR.

## 2021-09-05 NOTE — PROGRESS NOTES
[facility] Dosing Services: Vancomycin Dosing Progress Note    Consult for dosing of vancomycin by Dr. Nicole Ordoñez  Indication: bloodstream infection  Day of Therapy: 1 (Day #1 = 9/5/21)    Ht Readings from Last 1 Encounters:   09/05/21 172.7 cm (68\")     Wt Readings from Last 1 Encounters:   09/05/21 83.9 kg (185 lb)       Plan:   Start with loading dose of vancomycin 1750 mg    Follow with maintenance dose of vancomycin 1250 mg IV every 12 hours    Dose calculated to approximate a   Target AUC/ANA LUISA of 505  Trough of 15.2 mcg/mL. Plan:  Random level will be ordered for 06:00 on 9/6     Pharmacy to follow daily and will make changes to dose and/or frequency based on clinical status. Date Dose & Interval Measured level (mcg/mL) AUC/ANA LUISA and Extrapolated trough (mcg/mL)                         Other Antimicrobial  (not dosed by pharmacist)   Cefepime 2 g IV q12h  Metronidazole 500 mg q12h   Cultures     9/3: urine cx: coag neg staph - prelim  9/5: blood cx: pending   Serum Creatinine     Lab Results   Component Value Date/Time    Creatinine 1.00 09/05/2021 12:56 AM       Creatinine Clearance Estimated Creatinine Clearance: 119.2 mL/min (based on SCr of 1 mg/dL). Procalcitonin  No results found for: PCT   Temp   98.4 °F (36.9 °C)     WBC Lab Results   Component Value Date/Time    WBC 13.3 (H) 09/05/2021 12:56 AM          Pharmacy will follow the patient on a daily basis and make adjustments based on patient's clinical status.     Thank you, Jeremy Bunch

## 2021-09-05 NOTE — ED NOTES
Attempted to given report to med surg floor. Per  on med surg floor, nurse will call ED back for report at a later time.

## 2021-09-05 NOTE — H&P
Hospitalist Admission Note    NAME: Vannessa Dejesus   :  1995   MRN:  [id number]     Date/Time:  2021 4:44 AM    Patient PCP: Waldemar Escudero MD  ______________________________________________________________________  Given the patient's current clinical presentation, I have a high level of concern for decompensation if discharged from the emergency department. Complex decision making was performed, which includes reviewing the patient's available past medical records, laboratory results, and x-ray films. My assessment of this patient's clinical condition and my plan of care is as follows. Assessment / Plan:  Atypical chest pain and SIRS with e/o septic emboli in Pt with active heroin use: suspect infectious endocarditis r/t drug use  - CTA with 1. No CT evidence for central pulmonary embolus at this time. 2. Multiple pulmonary infiltrates, rounded peripheral predominantly, the largest in the right lower lobe with cavitation. Correlate for infection, including septic emboli.  - will need TTE vs YONI to r/o endocarditis  - given vancomycin x1 in ED, will ask pharmacy to additionally dose in AM  - oxycodone prn ordered for pain  Elevated AST: will trend LFTs, consider RUQ US  Low albumin: suspect nutritional deficiencies  - nutrition consult requested    Code Status: Full  Surrogate Decision Maker:  DVT Prophylaxis: lovenox    Baseline:       Subjective:   CHIEF COMPLAINT:  Chest pain    HISTORY OF PRESENT ILLNESS:     Vannessa Dejesus is a 23 y.o.  male who returns to ER this evening due to CP. Pt recently seen in ER on 9/3/21. Per ER MD notes from that date, \"Pt reporting generalized body aches, N/V, decreased appetite and night sweats x Monday. Pt reports he uses IV heroin and has been trying to withdrawal so he can get back on Suboxone. Pt reports last use was  or Monday. \" Pt was febrile 100.9 with  on presentation to the ER on that date.  He had CXR and urine culture and was advised to be admitted but chose to go home. He returns to ER this evening with R sided CP which radiates down his arm. No longer febrile. Additional history to be obtained when Pt is able to be seen on camera for admission. We were asked to admit for work up and evaluation of the above problems. Past Medical History:   Diagnosis Date    Asthma     BMI (body mass index), pediatric, 95-99% for age 7/17/2013    BMI (body mass index), pediatric, 95-99% for age    Alessandra Maldonado Hx MRSA infection     Otitis media     Viral meningitis 1996    [facility]        Past Surgical History:   Procedure Laterality Date    HX CIRCUMCISION      HX WISDOM TEETH EXTRACTION  2013       Social History     Tobacco Use    Smoking status: Current Every Day Smoker     Packs/day: 0.50    Smokeless tobacco: Never Used   Substance Use Topics    Alcohol use: Not Currently        Family History   Problem Relation Age of Onset    Diabetes Mother     Hypertension Maternal Grandmother     Cancer Maternal Grandmother         breast    Heart Disease Maternal Grandmother      Allergies   Allergen Reactions    Amoxicillin Swelling    Amoxicillin Hives    Codeine Swelling    Codeine Swelling    Sulfa (Sulfonamide Antibiotics) Hives     Happened when pt was an infant        Prior to Admission medications    Medication Sig Start Date End Date Taking? Authorizing Provider   ondansetron (Zofran ODT) 4 mg disintegrating tablet Take 1 Tablet by mouth every eight (8) hours as needed for Nausea. 9/3/21   Soniya Contreras MD   ciprofloxacin HCl (Cipro) 500 mg tablet Take 1 Tablet by mouth two (2) times a day for 7 days. 9/3/21 9/10/21  Soniya Contreras MD   ibuprofen (MOTRIN) 800 mg tablet Take 1 Tab by mouth every six (6) hours as needed for Pain. Patient not taking: Reported on 9/3/2021 4/7/21   River Arnold DO   cyclobenzaprine (FLEXERIL) 10 mg tablet Take 1 Tab by mouth three (3) times daily as needed for Muscle Spasm(s).   Patient not taking: Reported on 9/3/2021 4/7/21   Maria Isabel Haas DO   oxyCODONE-acetaminophen (PERCOCET) 5-325 mg per tablet Take 1 Tab by mouth every four (4) hours as needed for Pain. Max Daily Amount: 6 Tabs. Patient not taking: Reported on 9/3/2021 3/9/18   Rolando Beltran MD       REVIEW OF SYSTEMS:     I am not able to complete the review of systems because:    The patient is intubated and sedated    The patient has altered mental status due to his acute medical problems    The patient has baseline aphasia from prior stroke(s)    The patient has baseline dementia and is not reliable historian    The patient is in acute medical distress and unable to provide information           Total of 12 systems reviewed as follows:       POSITIVE= underlined text  Negative = text not underlined  General:  fever, chills, sweats, generalized weakness, weight loss/gain,      loss of appetite   Eyes:    blurred vision, eye pain, loss of vision, double vision  ENT:    rhinorrhea, pharyngitis   Respiratory:   cough, sputum production, SOB, PURVIS, wheezing, pleuritic pain   Cardiology:   chest pain, palpitations, orthopnea, PND, edema, syncope   Gastrointestinal:  abdominal pain , N/V, diarrhea, dysphagia, constipation, bleeding   Genitourinary:  frequency, urgency, dysuria, hematuria, incontinence   Muskuloskeletal :  arthralgia, myalgia, back pain  Hematology:  easy bruising, nose or gum bleeding, lymphadenopathy   Dermatological: rash, ulceration, pruritis, color change / jaundice  Endocrine:   hot flashes or polydipsia   Neurological:  headache, dizziness, confusion, focal weakness, paresthesia,     Speech difficulties, memory loss, gait difficulty  Psychological: Feelings of anxiety, depression, agitation    Objective:   VITALS:    Visit Vitals  /71 (BP 1 Location: Right upper arm, BP Patient Position: Sitting)   Pulse 96   Temp 98.9 °F (37.2 °C)   Resp 19   Ht 5' 8\" (1.727 m)   Wt 83.9 kg (185 lb)   SpO2 98%   BMI 28.13 kg/m² PHYSICAL EXAM:  Will examine on arrival to medical floor. _______________________________________________________________________  Care Plan discussed with:    Comments   Patient     Family      RN     Care Manager                    Consultant:  citlaly LOMAS   _______________________________________________________________________  Expected  Disposition:   Home with Family x   HH/PT/OT/RN    SNF/LTC    OLY    ________________________________________________________________________  TOTAL TIME:  39 Minutes    Critical Care Provided     Minutes non procedure based      Comments    x Reviewed previous records   >50% of visit spent in counseling and coordination of care x Discussion with patient and/or family and questions answered       ________________________________________________________________________  Signed: Sepideh Albert MD    Procedures: see electronic medical records for all procedures/Xrays and details which were not copied into this note but were reviewed prior to creation of Plan.     LAB DATA REVIEWED:    Recent Results (from the past 24 hour(s))   EKG, 12 LEAD, INITIAL    Collection Time: 09/05/21 12:40 AM   Result Value Ref Range    Ventricular Rate 105 BPM    Atrial Rate 105 BPM    P-R Interval 140 ms    QRS Duration 108 ms    Q-T Interval 334 ms    QTC Calculation (Bezet) 441 ms    Calculated P Axis 55 degrees    Calculated R Axis 17 degrees    Calculated T Axis 44 degrees    Diagnosis       Sinus tachycardia with premature atrial complexes  Otherwise normal ECG  When compared with ECG of 03-SEP-2021 16:02,  premature atrial complexes are now present     CBC WITH AUTOMATED DIFF    Collection Time: 09/05/21 12:56 AM   Result Value Ref Range    WBC 13.3 (H) 4.1 - 11.1 K/uL    RBC 4.01 (L) 4.10 - 5.70 M/uL    HGB 11.2 (L) 12.1 - 17.0 g/dL    HCT 34.2 (L) 36.6 - 50.3 %    MCV 85.3 80.0 - 99.0 FL    MCH 27.9 26.0 - 34.0 PG    MCHC 32.7 30.0 - 36.5 g/dL    RDW 14.1 11.5 - 14.5 %    PLATELET 058 910 - 400 K/uL    MPV 10.1 8.9 - 12.9 FL    NRBC 0.0 0  WBC    ABSOLUTE NRBC 0.00 0.00 - 0.01 K/uL    NEUTROPHILS 64 32 - 75 %    LYMPHOCYTES 18 12 - 49 %    MONOCYTES 12 5 - 13 %    EOSINOPHILS 1 0 - 7 %    BASOPHILS 1 0 - 1 %    IMMATURE GRANULOCYTES 4 (H) 0.0 - 0.5 %    ABS. NEUTROPHILS 8.6 (H) 1.8 - 8.0 K/UL    ABS. LYMPHOCYTES 2.4 0.8 - 3.5 K/UL    ABS. MONOCYTES 1.6 (H) 0.0 - 1.0 K/UL    ABS. EOSINOPHILS 0.1 0.0 - 0.4 K/UL    ABS. BASOPHILS 0.1 0.0 - 0.1 K/UL    ABS. IMM. GRANS. 0.5 (H) 0.00 - 0.04 K/UL    DF SMEAR SCANNED      RBC COMMENTS NORMOCYTIC, NORMOCHROMIC     METABOLIC PANEL, COMPREHENSIVE    Collection Time: 09/05/21 12:56 AM   Result Value Ref Range    Sodium 132 (L) 136 - 145 mmol/L    Potassium 3.5 3.5 - 5.1 mmol/L    Chloride 98 97 - 108 mmol/L    CO2 32 21 - 32 mmol/L    Anion gap 2 (L) 5 - 15 mmol/L    Glucose 143 (H) 65 - 100 mg/dL    BUN 8 6 - 20 MG/DL    Creatinine 1.00 0.70 - 1.30 MG/DL    BUN/Creatinine ratio 8 (L) 12 - 20      GFR est AA >60 >60 ml/min/1.73m2    GFR est non-AA >60 >60 ml/min/1.73m2    Calcium 8.2 (L) 8.5 - 10.1 MG/DL    Bilirubin, total 0.7 0.2 - 1.0 MG/DL    ALT (SGPT) 60 12 - 78 U/L    AST (SGOT) 51 (H) 15 - 37 U/L    Alk.  phosphatase 75 45 - 117 U/L    Protein, total 6.9 6.4 - 8.2 g/dL    Albumin 2.1 (L) 3.5 - 5.0 g/dL    Globulin 4.8 (H) 2.0 - 4.0 g/dL    A-G Ratio 0.4 (L) 1.1 - 2.2     TROPONIN I    Collection Time: 09/05/21 12:56 AM   Result Value Ref Range    Troponin-I, Qt. <0.05 <0.05 ng/mL   D DIMER    Collection Time: 09/05/21 12:56 AM   Result Value Ref Range    D-dimer 3.42 (H) 0.00 - 0.65 mg/L FEU

## 2021-09-05 NOTE — ED PROVIDER NOTES
EMERGENCY DEPARTMENT HISTORY AND PHYSICAL EXAM      Date: 9/5/2021  Patient Name: Konrad Perez    History of Presenting Illness     Chief Complaint   Patient presents with    Chest Pain    Generalized Body Aches         HPI: Konrad Perez, 23 y.o. male presents to the ED with cc of chest pain. He states that the started this afternoon, he describes as a constant sharp pain in the right chest that radiates down his right arm. No exacerbating or alleviating factors. He denies any associated shortness of breath, no coughing or fevers. He denies any history of trauma. No recent prolonged periods of immobilization. No unilateral leg pain. Does report some soreness in both of his lateral thighs. He was seen here 2 days ago, he had nausea vomiting and fever at that time, was considered to be admitted but said that he wanted to leave. He uses IV heroin, he last used yesterday. There are no other complaints, changes, or physical findings at this time. PCP: Farhat Pruitt MD    No current facility-administered medications on file prior to encounter. Current Outpatient Medications on File Prior to Encounter   Medication Sig Dispense Refill    ondansetron (Zofran ODT) 4 mg disintegrating tablet Take 1 Tablet by mouth every eight (8) hours as needed for Nausea. 10 Tablet 0    ciprofloxacin HCl (Cipro) 500 mg tablet Take 1 Tablet by mouth two (2) times a day for 7 days. 14 Tablet 0    ibuprofen (MOTRIN) 800 mg tablet Take 1 Tab by mouth every six (6) hours as needed for Pain. (Patient not taking: Reported on 9/3/2021) 20 Tab 0    cyclobenzaprine (FLEXERIL) 10 mg tablet Take 1 Tab by mouth three (3) times daily as needed for Muscle Spasm(s). (Patient not taking: Reported on 9/3/2021) 20 Tab 0    oxyCODONE-acetaminophen (PERCOCET) 5-325 mg per tablet Take 1 Tab by mouth every four (4) hours as needed for Pain. Max Daily Amount: 6 Tabs.  (Patient not taking: Reported on 9/3/2021) 15 Tab 0 Past History     Past Medical History:  Past Medical History:   Diagnosis Date    Asthma     BMI (body mass index), pediatric, 95-99% for age 7/17/2013    BMI (body mass index), pediatric, 95-99% for age    Franky Wilson Hx MRSA infection     Otitis media     [facility]itis 1996    [facility]       Past Surgical History:  Past Surgical History:   Procedure Laterality Date    HX CIRCUMCISION      HX WISDOM TEETH EXTRACTION  2013       Family History:  Family History   Problem Relation Age of Onset    Diabetes Mother     Hypertension Maternal Grandmother     Cancer Maternal Grandmother         breast    Heart Disease Maternal Grandmother        Social History:  Social History     Tobacco Use    Smoking status: Current Every Day Smoker     Packs/day: 0.50    Smokeless tobacco: Never Used   Substance Use Topics    Alcohol use: Not Currently    Drug use: Yes     Types: Heroin       Allergies: Allergies   Allergen Reactions    Amoxicillin Swelling    Amoxicillin Hives    Codeine Swelling    Codeine Swelling    Sulfa (Sulfonamide Antibiotics) Hives     Happened when pt was an infant         Review of Systems   Reports fever a few days ago  No eye pain  No ear pain  no shortness of breath  Reports right-sided chest pain  no abdominal pain  no dysuria  Reports bilateral thigh pain  Reports pain in the right arm    Physical Exam   Physical Exam  Constitutional:       General: He is not in acute distress. Appearance: He is not toxic-appearing. HENT:      Head: Normocephalic. Eyes:      Extraocular Movements: Extraocular movements intact. Cardiovascular:      Rate and Rhythm: Regular rhythm. Tachycardia present. Pulmonary:      Effort: Pulmonary effort is normal.      Breath sounds: Normal breath sounds. Abdominal:      Palpations: Abdomen is soft. Tenderness: There is no abdominal tenderness. Musculoskeletal:      Cervical back: Neck supple. Right lower leg: No tenderness.       Left lower leg: No tenderness. Skin:     General: Skin is warm and dry. Nails: There is no clubbing. Neurological:      General: No focal deficit present. Mental Status: He is alert and oriented to person, place, and time. Comments: Strong and symmetric  strength bilaterally, strong radial pulses bilaterally, sensation to light touch intact over upper left upper extremities bilaterally, no erythema or warmth of the extremities   Psychiatric:         Mood and Affect: Mood normal.         Diagnostic Study Results     Labs -     Recent Results (from the past 24 hour(s))   EKG, 12 LEAD, INITIAL    Collection Time: 09/05/21 12:40 AM   Result Value Ref Range    Ventricular Rate 105 BPM    Atrial Rate 105 BPM    P-R Interval 140 ms    QRS Duration 108 ms    Q-T Interval 334 ms    QTC Calculation (Bezet) 441 ms    Calculated P Axis 55 degrees    Calculated R Axis 17 degrees    Calculated T Axis 44 degrees    Diagnosis       Sinus tachycardia with premature atrial complexes  Otherwise normal ECG  When compared with ECG of 03-SEP-2021 16:02,  premature atrial complexes are now present         Radiologic Studies -   XR CHEST PORT    (Results Pending)     CT Results  (Last 48 hours)    None        CXR Results  (Last 48 hours)               09/03/21 1652  XR CHEST PORT Final result    Impression:  No acute cardiopulmonary disease radiographically. .  . Narrative:  INDICATION:  chest pain        EXAM: Chest single view. COMPARISON: 4/7/2021. FINDINGS: A single frontal view of the chest at 1647 hours shows clear lungs. The heart, mediastinum and pulmonary vasculature are stable . The bony thorax   is unremarkable for age. .                   Medical Decision Making   I am the first provider for this patient. I reviewed the vital signs, available nursing notes, past medical history, past surgical history, family history and social history.     Vital Signs-Reviewed the patient's vital signs. Patient Vitals for the past 24 hrs:   Temp Pulse Resp BP SpO2   09/05/21 0053 98.9 °F (37.2 °C) (!) 106 18 112/71 100 %         Provider Notes (Medical Decision Making):   21-year-old male presenting with right-sided chest pain. Symptoms concerning for possible musculoskeletal pain, pleuritic pain, will assess for any pulmonary infiltrates though he does deny any fevers or cough. He is otherwise low risk for PE but he is tachycardic here therefore D-dimer will be obtained. ED Course:     Initial assessment performed. The patients presenting problems have been discussed, and they are in agreement with the care plan formulated and outlined with them. I have encouraged them to ask questions as they arise throughout their visit. EKG is performed at 0: 40, shows sinus tachycardia at a rate of 105, , , QTc 441, axis upright, no ST segment elevation or depression concerning for ACS, this is interpreted as sinus tachycardia with PACs. On chart review, was seen in emergency department 2 days ago, had fever at that time, it was recommended that he be admitted but he declined. With history of IVDU and fever, differential does also include endocarditis, CT scan shows findings concerning for septic emboli. Basic metabolic panel normal renal function, hyponatremia of 132, troponin is negative. CBC with leukocytosis of 13.3, anemia with hemoglobin 11.2. Blood cultures will be obtained, IV vancomycin will be started. He agrees to admission.     Critical Care Time:     CRITICAL CARE NOTE :    5:07 AM    IMPENDING DETERIORATION -Cardiovascular  ASSOCIATED RISK FACTORS - Hypotension, Shock, Hypoxia and Dysrhythmia  MANAGEMENT- Bedside Assessment and Supervision of Care  INTERPRETATION -  Xrays, CT Scan, ECG and Blood Pressure  INTERVENTIONS -IV antibiotics  CASE REVIEW - Hospitalist/Intensivist and Nursing  TREATMENT RESPONSE -Stable  PERFORMED BY - Self    NOTES   :  I have spent 45 minutes of critical care time involved in lab review, consultations with specialist, family decision- making, bedside attention and documentation. This time excludes time spent in any separate billed procedures. During this entire length of time I was immediately available to the patient . Link MD Willie      Disposition:  Admit    PLAN:  1. Current Discharge Medication List        2.    Follow-up Information    None       Return to ED if worse     Diagnosis     Clinical Impression: Acute chest pain and pulmonary infiltrates concerning for septic emboli and endocarditis, history of IV drug use

## 2021-09-05 NOTE — ED NOTES
TRANSFER - OUT REPORT:    Verbal report given to Ana María Gee RN on Conner Armenta  being transferred to [facility] for routine progression of care       Report consisted of patients Situation, Background, Assessment and   Recommendations(SBAR). Information from the following report(s) SBAR, H&P, ED Summary, lab results was reviewed with the receiving nurse. Lines:   Peripheral IV 09/05/21 Left Antecubital (Active)   Site Assessment Clean, dry, & intact 09/05/21 0103   Phlebitis Assessment 0 09/05/21 0103   Infiltration Assessment 0 09/05/21 0103   Dressing Status Clean, dry, & intact 09/05/21 0103   Dressing Type Transparent 09/05/21 0103   Hub Color/Line Status Flushed 09/05/21 0103   Action Taken Blood drawn 09/05/21 0103        Opportunity for questions and clarification was provided.       Patient transported with:   Monitor

## 2021-09-05 NOTE — PROGRESS NOTES
Comprehensive Nutrition Assessment    Type and Reason for Visit: Initial, Consult    Nutrition Recommendations/Plan: Diet as tolerated. I will add supplements per pt preference. Nutrition Assessment:  (P) Pt admitted with CP and SIRS with septic emboli and suspected infective endocarditis in active drug user. Pt reports nausea, poor appetite in recent days. Otherwise, tolerating diet. Hx notable for long term drug abuse, elevated lactic acid. Albumin is not a marker for nutrition status in this pt given elev liver enzymes/liver insult. While pt reports recent poor appetite there is no reason to currently suspect malnutrition. Hx indicative of healthy teenager with normal growth patterns and otherwise healthy adult with the exception of substance abuse and sequelae. Estimated Daily Nutrient Needs:  Energy (kcal): (P) 1898; Weight Used for Energy Requirements: (P) Current  Protein (g): (P) 71 (.84 g/kg CBW); Weight Used for Protein Requirements: (P) Current  Fluid (ml/day): (P) 1900; Method Used for Fluid Requirements: (P) 1 ml/kcal      Nutrition Related Findings:  (P) Pt tolerating diet this admission      Wounds:    (P) None       Current Nutrition Therapies:  ADULT DIET Regular    Anthropometric Measures:  Height:  (P) 5' 8\" (172.7 cm)  Current Body Wt:  (P) 83.9 kg (185 lb)   Admission Body Wt:       Usual Body Wt:        Ideal Body Wt:  (P) 154 lbs:  (P) 120.1 %   Adjusted Body Weight:   ; Weight Adjustment for: (P) No adjustment   Adjusted BMI:       BMI Category:  (P) Overweight (BMI 25.0-29. 9)       Nutrition Diagnosis:   (P) No nutrition diagnosis at this time related to   as evidenced by (P) poor intake prior to admission, intake 51-75%    Nutrition Interventions:   Food and/or Nutrient Delivery: (P) Continue current diet, Start oral nutrition supplement  Nutrition Education and Counseling: (P) Education not indicated  Coordination of Nutrition Care: (P) Continue to monitor while inpatient    Goals:  (P) PO intake > 75% most meals and ONS while pt is with us.        Nutrition Monitoring and Evaluation:   Behavioral-Environmental Outcomes: (P) Readiness for change  Food/Nutrient Intake Outcomes: (P) Food and nutrient intake, Supplement intake  Physical Signs/Symptoms Outcomes: (P) None identified    Discharge Planning:    (P) No discharge needs at this time     Electronically signed by Maeve Avendaño, PhD, RD, [address]  on 9/5/2021 at 6:38 AM    Contact: [phone]

## 2021-09-05 NOTE — ED NOTES
[facility] - [facility] RN assumed care of pt.

## 2021-09-06 LAB
ALBUMIN SERPL-MCNC: 1.8 G/DL (ref 3.5–5)
ALBUMIN/GLOB SERPL: 0.4 {RATIO} (ref 1.1–2.2)
ALP SERPL-CCNC: 63 U/L (ref 45–117)
ALT SERPL-CCNC: 41 U/L (ref 12–78)
ANION GAP SERPL CALC-SCNC: 10 MMOL/L (ref 5–15)
AST SERPL-CCNC: 36 U/L (ref 15–37)
ATRIAL RATE: 105 BPM
BACTERIA SPEC CULT: ABNORMAL
BACTERIA SPEC CULT: NORMAL
BACTERIA SPEC CULT: NORMAL
BASOPHILS # BLD: 0.1 K/UL (ref 0–0.1)
BASOPHILS NFR BLD: 1 % (ref 0–1)
BILIRUB DIRECT SERPL-MCNC: 0.2 MG/DL (ref 0–0.2)
BILIRUB SERPL-MCNC: 0.5 MG/DL (ref 0.2–1)
BUN SERPL-MCNC: 7 MG/DL (ref 6–20)
BUN/CREAT SERPL: 9 (ref 12–20)
CALCIUM SERPL-MCNC: 8.1 MG/DL (ref 8.5–10.1)
CALCULATED P AXIS, ECG09: 55 DEGREES
CALCULATED R AXIS, ECG10: 17 DEGREES
CALCULATED T AXIS, ECG11: 44 DEGREES
CC UR VC: ABNORMAL
CHLORIDE SERPL-SCNC: 100 MMOL/L (ref 97–108)
CO2 SERPL-SCNC: 26 MMOL/L (ref 21–32)
CREAT SERPL-MCNC: 0.77 MG/DL (ref 0.7–1.3)
CREAT SERPL-MCNC: 0.86 MG/DL (ref 0.7–1.3)
DIAGNOSIS, 93000: NORMAL
DIFFERENTIAL METHOD BLD: ABNORMAL
EOSINOPHIL # BLD: 0.1 K/UL (ref 0–0.4)
EOSINOPHIL NFR BLD: 1 % (ref 0–7)
ERYTHROCYTE [DISTWIDTH] IN BLOOD BY AUTOMATED COUNT: 13.9 % (ref 11.5–14.5)
GLOBULIN SER CALC-MCNC: 4.9 G/DL (ref 2–4)
GLUCOSE SERPL-MCNC: 120 MG/DL (ref 65–100)
HCT VFR BLD AUTO: 33.3 % (ref 36.6–50.3)
HGB BLD-MCNC: 10.9 G/DL (ref 12.1–17)
IMM GRANULOCYTES # BLD AUTO: 0.7 K/UL
IMM GRANULOCYTES NFR BLD AUTO: 5 %
LYMPHOCYTES # BLD: 2 K/UL (ref 0.8–3.5)
LYMPHOCYTES NFR BLD: 14 % (ref 12–49)
MAGNESIUM SERPL-MCNC: 2 MG/DL (ref 1.6–2.4)
MCH RBC QN AUTO: 27.5 PG (ref 26–34)
MCHC RBC AUTO-ENTMCNC: 32.7 G/DL (ref 30–36.5)
MCV RBC AUTO: 84.1 FL (ref 80–99)
MONOCYTES # BLD: 1.6 K/UL (ref 0–1)
MONOCYTES NFR BLD: 11 % (ref 5–13)
NEUTS SEG # BLD: 10.1 K/UL (ref 1.8–8)
NEUTS SEG NFR BLD: 68 % (ref 32–75)
NRBC # BLD: 0 K/UL (ref 0–0.01)
NRBC BLD-RTO: 0 PER 100 WBC
P-R INTERVAL, ECG05: 140 MS
PHOSPHATE SERPL-MCNC: 4.7 MG/DL (ref 2.6–4.7)
PLATELET # BLD AUTO: 259 K/UL (ref 150–400)
PMV BLD AUTO: 10.1 FL (ref 8.9–12.9)
POTASSIUM SERPL-SCNC: 3.8 MMOL/L (ref 3.5–5.1)
PREALB SERPL-MCNC: 3 MG/DL (ref 20–40)
PROT SERPL-MCNC: 6.7 G/DL (ref 6.4–8.2)
Q-T INTERVAL, ECG07: 334 MS
QRS DURATION, ECG06: 108 MS
QTC CALCULATION (BEZET), ECG08: 441 MS
RBC # BLD AUTO: 3.96 M/UL (ref 4.1–5.7)
RBC MORPH BLD: ABNORMAL
SERVICE CMNT-IMP: ABNORMAL
SERVICE CMNT-IMP: NORMAL
SODIUM SERPL-SCNC: 136 MMOL/L (ref 136–145)
VENTRICULAR RATE, ECG03: 105 BPM
WBC # BLD AUTO: 14.6 K/UL (ref 4.1–11.1)

## 2021-09-06 PROCEDURE — 83735 ASSAY OF MAGNESIUM: CPT

## 2021-09-06 PROCEDURE — 74011250636 HC RX REV CODE- 250/636: Performed by: INTERNAL MEDICINE

## 2021-09-06 PROCEDURE — 36415 COLL VENOUS BLD VENIPUNCTURE: CPT

## 2021-09-06 PROCEDURE — 85025 COMPLETE CBC W/AUTO DIFF WBC: CPT

## 2021-09-06 PROCEDURE — 74011250637 HC RX REV CODE- 250/637: Performed by: STUDENT IN AN ORGANIZED HEALTH CARE EDUCATION/TRAINING PROGRAM

## 2021-09-06 PROCEDURE — 84134 ASSAY OF PREALBUMIN: CPT

## 2021-09-06 PROCEDURE — 74011250637 HC RX REV CODE- 250/637: Performed by: INTERNAL MEDICINE

## 2021-09-06 PROCEDURE — 84100 ASSAY OF PHOSPHORUS: CPT

## 2021-09-06 PROCEDURE — 80048 BASIC METABOLIC PNL TOTAL CA: CPT

## 2021-09-06 PROCEDURE — 65270000032 HC RM SEMIPRIVATE

## 2021-09-06 PROCEDURE — 74011250636 HC RX REV CODE- 250/636: Performed by: STUDENT IN AN ORGANIZED HEALTH CARE EDUCATION/TRAINING PROGRAM

## 2021-09-06 PROCEDURE — 87040 BLOOD CULTURE FOR BACTERIA: CPT

## 2021-09-06 PROCEDURE — 74011000258 HC RX REV CODE- 258: Performed by: STUDENT IN AN ORGANIZED HEALTH CARE EDUCATION/TRAINING PROGRAM

## 2021-09-06 PROCEDURE — 94760 N-INVAS EAR/PLS OXIMETRY 1: CPT

## 2021-09-06 PROCEDURE — 87077 CULTURE AEROBIC IDENTIFY: CPT

## 2021-09-06 PROCEDURE — 87186 SC STD MICRODIL/AGAR DIL: CPT

## 2021-09-06 PROCEDURE — 80076 HEPATIC FUNCTION PANEL: CPT

## 2021-09-06 RX ORDER — NALOXONE HYDROCHLORIDE 0.4 MG/ML
0.4 INJECTION, SOLUTION INTRAMUSCULAR; INTRAVENOUS; SUBCUTANEOUS
Status: DISCONTINUED | OUTPATIENT
Start: 2021-09-06 | End: 2021-09-07 | Stop reason: HOSPADM

## 2021-09-06 RX ADMIN — ONDANSETRON 4 MG: 4 TABLET, ORALLY DISINTEGRATING ORAL at 10:42

## 2021-09-06 RX ADMIN — Medication 10 ML: at 05:15

## 2021-09-06 RX ADMIN — METRONIDAZOLE 500 MG: 500 TABLET ORAL at 09:25

## 2021-09-06 RX ADMIN — CEFEPIME 2 G: 2 INJECTION, POWDER, FOR SOLUTION INTRAVENOUS at 20:11

## 2021-09-06 RX ADMIN — CEFEPIME HYDROCHLORIDE 2 G: 2 INJECTION, POWDER, FOR SOLUTION INTRAVENOUS at 11:00

## 2021-09-06 RX ADMIN — METRONIDAZOLE 500 MG: 500 TABLET ORAL at 10:42

## 2021-09-06 RX ADMIN — FOLIC ACID 1 MG: 1 TABLET ORAL at 09:25

## 2021-09-06 RX ADMIN — Medication 100 MG: at 09:25

## 2021-09-06 RX ADMIN — VANCOMYCIN HYDROCHLORIDE 1250 MG: 1 INJECTION, POWDER, LYOPHILIZED, FOR SOLUTION INTRAVENOUS at 05:15

## 2021-09-06 RX ADMIN — CEFEPIME HYDROCHLORIDE 2 G: 2 INJECTION, POWDER, FOR SOLUTION INTRAVENOUS at 01:34

## 2021-09-06 RX ADMIN — VANCOMYCIN HYDROCHLORIDE 1250 MG: 1 INJECTION, POWDER, LYOPHILIZED, FOR SOLUTION INTRAVENOUS at 17:20

## 2021-09-06 RX ADMIN — METRONIDAZOLE 500 MG: 500 TABLET ORAL at 21:25

## 2021-09-06 RX ADMIN — Medication 10 ML: at 13:51

## 2021-09-06 RX ADMIN — Medication 10 ML: at 21:25

## 2021-09-06 NOTE — PROGRESS NOTES
Telemed: Notifying you that patient has gram positive cocci clusters growing in 1 of 2 bottles and gram positive cocci clusters growing in 1 of 4 bottles per Kira Peoples at [facility] lab. Plan: Chart reviewed, patent is on Vancomycin IV.  Will repeat blood cultures x2

## 2021-09-06 NOTE — PROGRESS NOTES
Planning  1/ Needs f/u PCP appointment  2/ Substance Abuse resources/treatment  3/ Out patient hepatology follow-up appointment    Discussed in IDT rounds. May need long term IV ABT. Consult to ID. Reason for Admission:  HISTORY OF PRESENT ILLNESS:     Kel Baez is a 23 y.o.  male who returns to ER this evening due to CP. Pt recently seen in ER on 9/3/21. Per ER MD notes from that date, \"Pt reporting generalized body aches, N/V, decreased appetite and night sweats x Monday. Pt reports he uses IV heroin and has been trying to withdrawal so he can get back on Suboxone. Pt reports last use was Sunday or Monday. \"                      RUR Score:   10                  Plan for utilizing home health:    No HH at this time      PCP: First and Last name:  Denise Lindo MD     Name of Practice:    Are you a current patient: Yes/No: Y   Approximate date of last visit: 3 months   Can you participate in a virtual visit with your PCP:                     Current Advanced Directive/Advance Care Plan: Full Code  Wants CPR/ventilation  Healthcare decision maker is his mother, Lawyer Connell [phone]  FatherKari [phone]  Patient stated that Indira Beck is his fiance. [phone]    Healthcare Decision Maker:   Click here to complete [address] including selection of the Healthcare Decision Maker Relationship (ie \"Primary\")                             Transition of Care Plan:                    Lives with his uncle, Sanam Israel. Mr. Avni Conley does not have a phone. Uses [facility] - [facility] mile /Lab for medications. No previous home health or current DME  Has not seen PCP in more that 6 months  This is his 2nd relapse- Has received treatment at [facility] in the past.    Care Management Interventions  PCP Verified by CM:  Yes  Transition of Care Consult (CM Consult): Discharge Planning  Current Support Network: [facility] 2 for Transition of Care is Related to the Following Treatment Goals :  Follow-up PCP, Substance Abuse Resources  The Patient and/or Patient Representative was Provided with a Choice of Provider and Agrees with the Discharge Plan?: Yes  Name of the Patient Representative Who was Provided with a Choice of Provider and Agrees with the Discharge Plan: Patient  Freedom of Choice List was Provided with Basic Dialogue that Supports the Patient's Individualized Plan of Care/Goals, Treatment Preferences and Shares the Quality Data Associated with the Providers?: Yes  Discharge Location  Discharge Placement: [facility]  [phone]

## 2021-09-06 NOTE — ACP (ADVANCE CARE PLANNING)
Current Advanced Directive/Advance Care Plan: Full Code  Wants CPR/ventilation  Healthcare decision maker is his mother, Nicolette Smith [phone]  Father, Vitor Washington [phone]  Patient stated that Rik Range is his fiance.  Tere 5, BSW/CM  [phone]

## 2021-09-06 NOTE — PROGRESS NOTES
Pharmacy Automatic Renal Dosing Protocol - Antimicrobials  Renal dose adjustment of cefepime  Indication: Bloodstream infection  Physician: Ethel Guardado  Day of therapy: 2    Cefepime adjusted based on CrCl > 100 mL/min. Dose adjusted to cefepime 2 g IV every 8 hours. Previous dose: cefepime 2 g IV every 12 hours. Pharmacy will follow daily and adjust medications as appropriate for renal function.     Thank you,  Naa Cabral, PharmD, BCPS  [phone]

## 2021-09-06 NOTE — PROGRESS NOTES
This writer's message to on call provider:    Notifying you that patient has gram positive cocci clusters growing in 1 of 2 bottles and gram positive cocci clusters growing in 1 of 4 bottles per Edd Torres at [address] [place] [address] lab.

## 2021-09-06 NOTE — PROGRESS NOTES
Bedside shift change report given to Wiliam Gaspar (oncoming nurse) by Jamison Angulo (offgoing nurse). Report included the following information SBAR, Kardex, Intake/Output, MAR, Recent Results and Cardiac Rhythm SR,ST w/PAC's.

## 2021-09-06 NOTE — PROGRESS NOTES
Consulted for gram-positive bacteremia. Chart reviewed for pertinent details. Continue broad-spectrum antibiotics as present for now. Follow-up identification and susceptibilities of the gram-positive in blood. Repeat cultures sent by primary team today. TTE negative for vegetations. If bacteremia is high-grade or continuous, would recommend YONI. Will narrow antibiotics pending clinical course. Will plan to see patient via telemedicine on 9/7/2021. D/w Dr. Rayne Quick.        Beth Bran MD  Infectious Diseases

## 2021-09-06 NOTE — PROGRESS NOTES
Hospitalist Progress Note    NAME: Josette Cardoza   :  1995   MRN:  [id number]   Room Number:  009/14  @ [address] [facility]       Interim Hospital Summary: 23 y.o. male whom presented on 2021 with      Assessment / Plan:    #Gram-positive cocci in clusters 3 out of 4  #IV drug use: heroine     -TTE on admission without any vegetations   -Blood cultures 9 2021 gram-positive cocci 2/ and    -Repeat blood cultures today  -Continue vancomycin,  cefepime and metronidazole  -We will consult ID once have complete speciation  -We will get addiction medicine on board      #Pleuritic chest pain POA resolved   #Septic emboli  -CTA reviewed dependently multiple pulmonary infiltrates largest right lower lobe with cavitation suspect septic emboli      #Hep C  #Elevated liver enzyme  -Need outpatient follow-up with hepatology/primary care    #Heroin abuse  - Patient was counseled extensively on the need to abstain from drugs its addictive tendencies, its deleterious effects on the brain, cardiovascular system, lungs as well as its financial & social sequelae            Code status: Full  Prophylaxis: Lovenox  Recommended Disposition: [facility] for IV Abx      Subjective:     Chief Complaint / Reason for Physician Visit  COWS 4     Discussed with RN events overnight. Review of Systems:  No fevers, chills, appetite change, cough, sputum production, shortness of breath, dyspnea on exertion, nausea, vomitting, diarrhea, constipation, chest pain, leg edema, abdominal pain, joint pain, rash, itching. Tolerating PT/OT. Tolerating diet. Objective:     VITALS:   Last 24hrs VS reviewed since prior progress note.  Most recent are:  Patient Vitals for the past 24 hrs:   Temp Pulse Resp BP SpO2   21 0449 98.4 °F (36.9 °C) 75 18 119/72 98 %   21 0020 98.9 °F (37.2 °C) 88 21 122/65 98 %   21 2356 -- (!) 102 -- -- --   21 2107 (!) 100.8 °F (38.2 °C) (!) 108 21 (!) 118/55 98 % 09/05/21 1959 -- 85 -- -- --   09/05/21 1610 98 °F (36.7 °C) 98 20 116/69 100 %   09/05/21 1400 100 °F (37.8 °C) 86 26 120/69 96 %   09/05/21 1100 -- 86 20 114/63 97 %   09/05/21 1030 -- 92 21 116/65 95 %   09/05/21 1000 -- 94 23 117/68 96 %   09/05/21 0930 -- 89 22 120/69 96 %   09/05/21 0900 -- 85 21 107/63 96 %   09/05/21 0830 -- 91 21 116/70 96 %       Intake/Output Summary (Last 24 hours) at 9/6/2021 0823  Last data filed at 9/6/2021 0301  Gross per 24 hour   Intake 3286 ml   Output 3850 ml   Net -564 ml        PHYSICAL EXAM:  General: WD, WN. Alert, cooperative, no acute distress    EENT:  EOMI. Anicteric sclerae. MMM  Resp:  CTA bilaterally, no wheezing or rales. No accessory muscle use  CV:  Regular  rhythm,  normal S1/S2, no murmurs rubs gallops, No edema  GI:  Soft, Non distended, Non tender. +Bowel sounds  Neurologic:  Alert and oriented X 3, normal speech,   Psych:   Good insight. Not anxious nor agitated  Skin:  No rashes. No jaundice, multiple injection site marks     Reviewed most current lab test results and cultures  YES  Reviewed most current radiology test results   YES  Review and summation of old records today    NO  Reviewed patient's current orders and MAR    YES  PMH/ reviewed - no change compared to H&P  ________________________________________________________________________  Care Plan discussed with:    Comments   Patient x    Family      RN x    Care Manager x    Consultant                       x Multidiciplinary team rounds were held today with , nursing, pharmacist and clinical coordinator. Patient's plan of care was discussed; medications were reviewed and discharge planning was addressed.      ________________________________________________________________________  Total NON critical care TIME:  25   Minutes    Total CRITICAL CARE TIME Spent:   Minutes non procedure based      Comments   >50% of visit spent in counseling and coordination of care x ________________________________________________________________________  Magda Moreno MD     Procedures: see electronic medical records for all procedures/Xrays and details which were not copied into this note but were reviewed prior to creation of Plan. LABS:  I reviewed today's most current labs and imaging studies.   Pertinent labs include:  Recent Labs     09/06/21 0119 09/05/21 0056 09/03/21  1559   WBC 14.6* 13.3* 13.0*   HGB 10.9* 11.2* 11.3*   HCT 33.3* 34.2* 34.5*    215 155     Recent Labs     09/06/21 0119 09/05/21 0056 09/03/21  1559    132* 130*   K 3.8 3.5 3.4*    98 92*   CO2 26 32 31   * 143* 184*   BUN 7 8 7   CREA 0.77  0.86 1.00 1.04   CA 8.1* 8.2* 8.3*   MG 2.0  --   --    PHOS 4.7  --   --    ALB 1.8* 2.1* 2.4*   TBILI 0.5 0.7 0.9   ALT 41 60 68       Signed: Magda Moreno MD

## 2021-09-06 NOTE — PROGRESS NOTES
1945: Shift report from [address] [place] [address]. Assessed pt at the bedside, shivering and cold. Hot packs and blankets applied. 2015: Shivering subsided. Assessment completed. 2030: The pt has belongings in his room that he requests to be sent home with his wife. Her number was added to the chart, I called and had to leave a voicemail. 2100: MEWS 3 due to temp and heart rate. Will medicate with Tylenol. 2300: PO meds given. Resting comfortably. 0130: IV cefepime initiated. IV intermittently occludes. Attempted to obtain a second IV but unsuccessful. 0150: Handoff and report given to [employer].

## 2021-09-07 ENCOUNTER — APPOINTMENT (OUTPATIENT)
Dept: ULTRASOUND IMAGING | Age: 26
DRG: 872 | End: 2021-09-07
Attending: STUDENT IN AN ORGANIZED HEALTH CARE EDUCATION/TRAINING PROGRAM
Payer: COMMERCIAL

## 2021-09-07 VITALS
OXYGEN SATURATION: 98 % | SYSTOLIC BLOOD PRESSURE: 121 MMHG | WEIGHT: 183.5 LBS | TEMPERATURE: 98.7 F | BODY MASS INDEX: 27.81 KG/M2 | HEART RATE: 82 BPM | RESPIRATION RATE: 18 BRPM | DIASTOLIC BLOOD PRESSURE: 66 MMHG | HEIGHT: 68 IN

## 2021-09-07 LAB
ANION GAP SERPL CALC-SCNC: 11 MMOL/L (ref 5–15)
BASOPHILS # BLD: 0 K/UL (ref 0–0.1)
BASOPHILS NFR BLD: 0 % (ref 0–1)
BUN SERPL-MCNC: 7 MG/DL (ref 6–20)
BUN/CREAT SERPL: 11 (ref 12–20)
CALCIUM SERPL-MCNC: 8.6 MG/DL (ref 8.5–10.1)
CHLORIDE SERPL-SCNC: 103 MMOL/L (ref 97–108)
CO2 SERPL-SCNC: 23 MMOL/L (ref 21–32)
CREAT SERPL-MCNC: 0.66 MG/DL (ref 0.7–1.3)
DIFFERENTIAL METHOD BLD: ABNORMAL
EOSINOPHIL # BLD: 0 K/UL (ref 0–0.4)
EOSINOPHIL NFR BLD: 0 % (ref 0–7)
ERYTHROCYTE [DISTWIDTH] IN BLOOD BY AUTOMATED COUNT: 13.8 % (ref 11.5–14.5)
GLUCOSE SERPL-MCNC: 118 MG/DL (ref 65–100)
HCT VFR BLD AUTO: 36.3 % (ref 36.6–50.3)
HGB BLD-MCNC: 11.8 G/DL (ref 12.1–17)
IMM GRANULOCYTES # BLD AUTO: 0 K/UL
IMM GRANULOCYTES NFR BLD AUTO: 0 %
LYMPHOCYTES # BLD: 2.6 K/UL (ref 0.8–3.5)
LYMPHOCYTES NFR BLD: 15 % (ref 12–49)
MCH RBC QN AUTO: 26.9 PG (ref 26–34)
MCHC RBC AUTO-ENTMCNC: 32.5 G/DL (ref 30–36.5)
MCV RBC AUTO: 82.7 FL (ref 80–99)
MONOCYTES # BLD: 1.7 K/UL (ref 0–1)
MONOCYTES NFR BLD: 10 % (ref 5–13)
NEUTS BAND NFR BLD MANUAL: 14 % (ref 0–6)
NEUTS SEG # BLD: 12.7 K/UL (ref 1.8–8)
NEUTS SEG NFR BLD: 61 % (ref 32–75)
NRBC # BLD: 0 K/UL (ref 0–0.01)
NRBC BLD-RTO: 0 PER 100 WBC
PLATELET # BLD AUTO: 339 K/UL (ref 150–400)
PMV BLD AUTO: 9.8 FL (ref 8.9–12.9)
POTASSIUM SERPL-SCNC: 3.8 MMOL/L (ref 3.5–5.1)
RBC # BLD AUTO: 4.39 M/UL (ref 4.1–5.7)
RBC MORPH BLD: ABNORMAL
SODIUM SERPL-SCNC: 137 MMOL/L (ref 136–145)
VANCOMYCIN SERPL-MCNC: 7.2 UG/ML
WBC # BLD AUTO: 17 K/UL (ref 4.1–11.1)

## 2021-09-07 PROCEDURE — 80048 BASIC METABOLIC PNL TOTAL CA: CPT

## 2021-09-07 PROCEDURE — 74011250636 HC RX REV CODE- 250/636: Performed by: STUDENT IN AN ORGANIZED HEALTH CARE EDUCATION/TRAINING PROGRAM

## 2021-09-07 PROCEDURE — 87902 NFCT AGT GNTYP ALYS HEP C: CPT

## 2021-09-07 PROCEDURE — 74011250637 HC RX REV CODE- 250/637: Performed by: INTERNAL MEDICINE

## 2021-09-07 PROCEDURE — 80202 ASSAY OF VANCOMYCIN: CPT

## 2021-09-07 PROCEDURE — 85025 COMPLETE CBC W/AUTO DIFF WBC: CPT

## 2021-09-07 PROCEDURE — 74011250636 HC RX REV CODE- 250/636: Performed by: INTERNAL MEDICINE

## 2021-09-07 PROCEDURE — 74011000258 HC RX REV CODE- 258: Performed by: STUDENT IN AN ORGANIZED HEALTH CARE EDUCATION/TRAINING PROGRAM

## 2021-09-07 PROCEDURE — 74011250637 HC RX REV CODE- 250/637: Performed by: STUDENT IN AN ORGANIZED HEALTH CARE EDUCATION/TRAINING PROGRAM

## 2021-09-07 PROCEDURE — 76705 ECHO EXAM OF ABDOMEN: CPT

## 2021-09-07 PROCEDURE — 36415 COLL VENOUS BLD VENIPUNCTURE: CPT

## 2021-09-07 PROCEDURE — 87522 HEPATITIS C REVRS TRNSCRPJ: CPT

## 2021-09-07 RX ADMIN — ACETAMINOPHEN 650 MG: 325 TABLET ORAL at 09:33

## 2021-09-07 RX ADMIN — VANCOMYCIN HYDROCHLORIDE 1250 MG: 1 INJECTION, POWDER, LYOPHILIZED, FOR SOLUTION INTRAVENOUS at 05:01

## 2021-09-07 RX ADMIN — ONDANSETRON 4 MG: 4 TABLET, ORALLY DISINTEGRATING ORAL at 09:53

## 2021-09-07 RX ADMIN — CEFEPIME 2 G: 2 INJECTION, POWDER, FOR SOLUTION INTRAVENOUS at 02:38

## 2021-09-07 RX ADMIN — FOLIC ACID 1 MG: 1 TABLET ORAL at 09:30

## 2021-09-07 RX ADMIN — Medication 100 MG: at 09:30

## 2021-09-07 RX ADMIN — LOPERAMIDE HYDROCHLORIDE 2 MG: 2 CAPSULE ORAL at 09:53

## 2021-09-07 RX ADMIN — METRONIDAZOLE 500 MG: 500 TABLET ORAL at 09:30

## 2021-09-07 RX ADMIN — CEFEPIME 2 G: 2 INJECTION, POWDER, FOR SOLUTION INTRAVENOUS at 11:15

## 2021-09-07 NOTE — PROGRESS NOTES
HILDA  RUR 9%    Notified by Dr. Jt Alcocer that patient is leaving AMA. CM called PCP office to make appointment and was informed that Dr. Josemanuel Naqvi is patient's pediatrician and will not longer be able to see patient. Care Management Interventions  PCP Verified by CM: Yes  Transition of Care Consult (CM Consult): Discharge Planning  Current Support Network: Nur[facility] 2 for Transition of Care is Related to the Following Treatment Goals :  Follow-up PCP, Substance Abuse Resources  The Patient and/or Patient Representative was Provided with a Choice of Provider and Agrees with the Discharge Plan?: Yes  Name of the Patient Representative Who was Provided with a Choice of Provider and Agrees with the Discharge Plan: Patient  Freedom of Choice List was Provided with Basic Dialogue that Supports the Patient's Individualized Plan of Care/Goals, Treatment Preferences and Shares the Quality Data Associated with the Providers?: Yes  Discharge Location  Discharge Placement: [facility]  [phone]

## 2021-09-07 NOTE — PROGRESS NOTES
0710) Bedside shift change report given to Sebas Jenikns RN (oncoming nurse) by Yonny Camarena RN (offgoing nurse). Report included the following information SBAR, Kardex, [facility] and Cardiac Rhythm NSR.   0859) pt concern numbness L index finger numb/initally swollen and numbness L great toe and 4th digit. R shoulder sore. No vomiting overnight- denies nausea  0950) pt large amount of emesis and loose stool. 1000) IDR with Nayeli WILLSON), Hoang Mancilla (pharmacist), Rosemary (), Yohannes Marin (nurse supervisor), Tami Quick (wound RN/infection control), and Sebas Jenkins (RN) to discuss plan of care including pt vomiting, blood culture positive for gram positive cocci, plan for YONI, waiting for PICC until blood cultures negative. 1148) Discuss with Dr. Ana María Inman YONI at [phone] at [facility] with Dr. Shannon Burrows  ) Discuss with SMA Lidia order for COVID and YONI.    [phone]) Discuss with Tyson Rubin  [phone]) pt tearful about transfer to [facility]. Pt permission to speak to mother on phone-- pt mother upset about transfer to [facility] since pt sister had  there, currently involved in lawsuit. 1230) Discuss with Milagros Islas [facility] cardiology, pt might refuse procedure. Retime at 1600 since pt ate breakfast  3773) Dr. Ana María Inman and Yohannes Marin, nursing supervisor at bedside to discuss AMA form. IV removed. Pt plan to go to [facility]) Yonny Camarena, manager at [facility] notified pt left.

## 2021-09-07 NOTE — PROGRESS NOTES
I went and spoke with the patient after Dr. Karma Lynch came and told me that the patient wanted to leave [place] because he does not want to be in [place] [address]. We were sending him to [facility] for a YONI and he refused to got there and then when the MD/Nurse told him we are a [place] [address] he did not want to stay here either because he states  \"[place]\" killed his sister and they are in a law suite. Latricia Stevenson, Dr. Jay Underwood and I spoke with him multiple times but he refused to stay.

## 2021-09-07 NOTE — PROGRESS NOTES
[address] [place] [address] Pharmacy Dosing Services: Antimicrobial Stewardship Daily Doc  Consult for dosing of vancomycin by Dr. Omar Villagran  Indication: Bloodstream Infection  Day of Therapy: 3    Ht Readings from Last 1 Encounters:   09/05/21 172.7 cm (68\")        Wt Readings from Last 1 Encounters:   09/06/21 83.2 kg (183 lb 8 oz)        Vancomycin therapy:  Current maintenance dose: vancomycin 1250 mg IV every 12 hours   Last level: 7.2 mcg/mL corresponds to an   Dose calculated to approximate a target AUC/ANA LUISA of 400-600    Plan: SCr stable. Level this morning below goal. Increase to vancomycin 1250 mg IV every 8 hours. Dose administration notes:   Doses given appropriately as scheduled    Date Dose & Interval Measured (mcg/mL) Extrapolated AUC   9/7 1250 mg IV q12h 7.2 335                   Other Antimicrobial   (not dosed by pharmacist) Metronidazole 500 mg PO q12h - Day 3  Cefepime 2 g IV every 8 hours - Day 3   Cultures 9/5 blood: GPC in clusters in 2/4 bottles (prelim)  9/5 blood: GPC in clusters in 1/2 bottles (prelim)  9/5 MRSA screen: Negative  9/6 blood: GPC in clusters in 1/4 bottles (prelim)   Serum Creatinine Lab Results   Component Value Date/Time    Creatinine 0.66 (L) 09/07/2021 02:24 AM       Creatinine Clearance Estimated Creatinine Clearance: 169.5 mL/min (A) (by C-G formula based on SCr of 0.66 mg/dL (L)). Temp Temp: 98.7 °F (37.1 °C)     WBC Lab Results   Component Value Date/Time    WBC 17.0 (H) 09/07/2021 02:24 AM      Procalcitonin No results found for: PCT   For Antifungals, Metronidazole and Nafcillin: Lab Results   Component Value Date/Time    ALT (SGPT) 41 09/06/2021 01:19 AM    AST (SGOT) 36 09/06/2021 01:19 AM    Alk.  phosphatase 63 09/06/2021 01:19 AM    Bilirubin, total 0.5 09/06/2021 01:19 AM        Thank you,  Oksana Adair, PharmD, BCPS  [phone]

## 2021-09-07 NOTE — DISCHARGE SUMMARY
Hospitalist Discharge Summary     Patient ID:  Bárbara Rueda  [id number]  8 y.o.  1995    PCP on record: Tiffany Zepeda MD    Admit date: 9/5/2021  Discharge date and time: 9/7/2021      Admission Diagnoses: Septic embolism [facility]) Juanjose Villavicencio  Left AGAINST MEDICAL ADVICE  Discharge Diagnoses: Active Problems:    Septic embolism ([address] (9/5/2021)           Hospital Course:       #Persistent Gram-positive cocci in clusters   #IV drug use: heroine     -TTE on admission without any vegetations   -Blood cultures 9 5/1/2021 staph aureus 2/4 and 1/2   -Repeat blood cultures gram-positive cocci  -Continue vancomycin,  cefepime and metronidazole  - ID consulted, appreciate help   -Patient was scheduled for YONI at [facility] [place] at 1:30 PM today. Patient states that he does not want to go to [facility] as he has potential lawsuit against [facility] system. Discussed with patient that he is only going for  procedure and he will come back patient denied transport stating that he wants to leave [address] when  he was updated that [facility] is also within [facility] system. Patient was updated that his persistent bacteremia needs further treatment and evaluation including YONI to rule out endocarditis. Patient with updated that untreated endocarditis and bacteremia could lead to septic shock heart failure or even death.   Patient understood the risk and signed the [place] papers and left AGAINST MEDICAL ADVICE  Nursing supervisor and RN were at bedside with me      #Pleuritic chest pain POA resolved   #Septic emboli  -CTA reviewed dependently multiple pulmonary infiltrates largest right lower lobe with cavitation suspect septic emboli      #Hep C  #Elevated liver enzyme  - PCR pending   -Need outpatient follow-up with hepatology/primary care    #Heroin abuse  - Patient was counseled extensively on the need to abstain from drugs its addictive tendencies, its deleterious effects on the brain, cardiovascular system, lungs as well as its financial & social sequelae        CONSULTATIONS:  IP CONSULT TO INFECTIOUS DISEASES  IP CONSULT TO PAIN MANAGEMENT    Excerpted HPI from H&P of Irene Durand MD:    Jessica Enrique is a 23 y.o.  male who returns to ER this evening due to CP. Pt recently seen in ER on 9/3/21. Per ER MD notes from that date, \"Pt reporting generalized body aches, N/V, decreased appetite and night sweats x Monday. Pt reports he uses IV heroin and has been trying to withdrawal so he can get back on Suboxone. Pt reports last use was Sunday or Monday. \" Pt was febrile 100.9 with  on presentation to the ER on that date. He had CXR and urine culture and was advised to be admitted but chose to go home. He returns to ER this evening with R sided CP which radiates down his arm. No longer febrile. Additional history to be obtained when Pt is able to be seen on camera for admission. ______________________________________________________________________  DISCHARGE SUMMARY/HOSPITAL COURSE:  for full details see H&P, daily progress notes, labs, consult notes. _______________________________________________________________________  Patient seen and examined by me on discharge day. Pertinent Findings:  Gen:    Not in distress  Chest: Clear lungs  CVS:   Regular rhythm. No edema  Abd:  Soft, not distended, not tender  Neuro:  Alert with good insight. Oriented to person, place, and time   _______________________________________________________________________  DISCHARGE MEDICATIONS:   Discharge Medication List as of 9/7/2021  1:03 PM          My Recommended Diet, Activity, Wound Care, and follow-up labs are listed in the patient's Discharge Insturctions which I have personally completed and reviewed.     _______________________________________________________________________  DISPOSITION:     Home with Family:    Home with HH/PT/OT/RN:    SNF/LTC: OLY:    OTHER:        Condition at Discharge: Left AGAINST MEDICAL ADVICE  _______________________________________________________________________  Follow up with:   PCP : Jaime Little MD  Follow-up Information     Follow up With Specialties Details [address] and Primary Care Internal Medicine   NATHANIEL Stratton [facility] [address]  [phone]              Total time in minutes spent coordinating this discharge (includes going over instructions, follow-up, prescriptions, and preparing report for sign off to her PCP) :  50 minutes    Signed:  Krystin Kidd MD

## 2021-09-09 LAB
HCV GENOTYPE: NORMAL
HCV GENTYP SERPL NAA+PROBE: 3
HCV RNA SERPL NAA+PROBE-ACNC: NORMAL IU/ML
HCV RNA SERPL NAA+PROBE-LOG IU: 5.69 LOG10 IU/ML
PLEASE NOTE, 550474: NORMAL
TEST INFORMATION, 550045: NORMAL

## 2021-09-10 LAB
BACTERIA SPEC CULT: ABNORMAL
SERVICE CMNT-IMP: ABNORMAL
SERVICE CMNT-IMP: ABNORMAL

## 2021-09-11 LAB
BACTERIA SPEC CULT: ABNORMAL
BACTERIA SPEC CULT: ABNORMAL
SERVICE CMNT-IMP: ABNORMAL

## 2022-09-20 ENCOUNTER — HOSPITAL ENCOUNTER (EMERGENCY)
Age: 27
Discharge: HOME | End: 2022-09-20
Attending: EMERGENCY MEDICINE
Payer: MEDICAID

## 2022-09-20 VITALS
HEART RATE: 100 BPM | RESPIRATION RATE: 16 BRPM | HEIGHT: 69 IN | DIASTOLIC BLOOD PRESSURE: 72 MMHG | WEIGHT: 223.5 LBS | BODY MASS INDEX: 33.1 KG/M2 | TEMPERATURE: 98.3 F | SYSTOLIC BLOOD PRESSURE: 115 MMHG | OXYGEN SATURATION: 98 %

## 2022-09-20 DIAGNOSIS — H66.002 NON-RECURRENT ACUTE SUPPURATIVE OTITIS MEDIA OF LEFT EAR WITHOUT SPONTANEOUS RUPTURE OF TYMPANIC MEMBRANE: Primary | ICD-10-CM

## 2022-09-20 PROCEDURE — 99283 EMERGENCY DEPT VISIT LOW MDM: CPT

## 2022-09-20 RX ORDER — DOXYCYCLINE HYCLATE 100 MG
100 TABLET ORAL 2 TIMES DAILY
Qty: 14 TABLET | Refills: 0 | Status: SHIPPED | OUTPATIENT
Start: 2022-09-20 | End: 2022-09-27

## 2022-09-20 NOTE — ED PROVIDER NOTES
EMERGENCY DEPARTMENT HISTORY AND PHYSICAL EXAM      Date: 9/20/2022  Patient Name: Natasha Hoover    History of Presenting Illness     Chief Complaint   Patient presents with    Ear Pain       History Provided By: Patient    HPI: Natasha Hoover, 33 y.o. male with PMHx significant for asthma, presents to the ED with cc of decreased hearing from the left ear for 2 days. Patient reports it feels like his left ear is clogged and that there is fluid behind it. He has tried using eardrops and cleaning out with a Q-tip without relief. He denies any pain or drainage from the ear. There are no other complaints, changes, or physical findings at this time. PCP: Yoli Edouard MD    No current facility-administered medications on file prior to encounter. Current Outpatient Medications on File Prior to Encounter   Medication Sig Dispense Refill    buprenorphine-naloxone (SUBOXONE) 8-2 mg film sublingaul film 1 Film by SubLINGual route daily. (Patient not taking: Reported on 9/20/2022)         Past History     Past Medical History:  Past Medical History:   Diagnosis Date    Asthma     BMI (body mass index), pediatric, 95-99% for age 7/17/2013    BMI (body mass index), pediatric, 95-99% for age     Hx MRSA infection     Otitis media     Viral meningitis 1996    [facility]       Past Surgical History:  Past Surgical History:   Procedure Laterality Date    HX CIRCUMCISION      HX WISDOM TEETH EXTRACTION  2013       Family History:  Family History   Problem Relation Age of Onset    Diabetes Mother     Hypertension Maternal Grandmother     Cancer Maternal Grandmother         breast    Heart Disease Maternal Grandmother        Social History:  Social History     Tobacco Use    Smoking status: Every Day     Packs/day: 0.50     Types: Cigarettes    Smokeless tobacco: Never   Substance Use Topics    Alcohol use: Not Currently    Drug use: Not Currently     Types: Heroin       Allergies:   Allergies   Allergen Reactions Amoxicillin Swelling    Amoxicillin Hives    Codeine Swelling    Codeine Swelling    Sulfa (Sulfonamide Antibiotics) Hives     Happened when pt was an infant         Review of Systems   Review of Systems   Constitutional:  Negative for chills and fever. HENT:  Positive for hearing loss. Negative for ear pain and sore throat. Eyes:  Negative for redness and visual disturbance. Respiratory:  Negative for cough and shortness of breath. Cardiovascular:  Negative for chest pain and palpitations. Gastrointestinal:  Negative for abdominal pain, nausea and vomiting. Genitourinary:  Negative for dysuria and hematuria. Musculoskeletal:  Negative for back pain and gait problem. Skin:  Negative for rash and wound. Neurological:  Negative for dizziness and headaches. Psychiatric/Behavioral:  Negative for behavioral problems and confusion. All other systems reviewed and are negative. Physical Exam   Physical Exam  Vitals and nursing note reviewed. Constitutional:       Appearance: He is not toxic-appearing. HENT:      Head: Normocephalic and atraumatic. Ears:      Comments: Left TM is erythematous and dull with some bloody/purulent drainage over the TM. It is difficult to assess for perforation due to material.  No edema or erythema of the external auditory canal.  No cerumen impaction. Mouth/Throat:      Mouth: Mucous membranes are moist.   Eyes:      Extraocular Movements: Extraocular movements intact. Pupils: Pupils are equal, round, and reactive to light. Cardiovascular:      Rate and Rhythm: Normal rate and regular rhythm. Pulmonary:      Effort: Pulmonary effort is normal. No respiratory distress. Musculoskeletal:         General: No deformity. Normal range of motion. Cervical back: Normal range of motion and neck supple. Skin:     General: Skin is warm and dry. Neurological:      General: No focal deficit present.       Mental Status: He is alert and oriented to person, place, and time. Psychiatric:         Behavior: Behavior normal.         Diagnostic Study Results     Labs -   No results found for this or any previous visit (from the past 12 hour(s)). Radiologic Studies -   No orders to display     CT Results  (Last 48 hours)      None          CXR Results  (Last 48 hours)      None              Medical Decision Making   I am the first provider for this patient. I reviewed the vital signs, available nursing notes, past medical history, past surgical history, family history and social history. Vital Signs-Reviewed the patient's vital signs. Patient Vitals for the past 12 hrs:   Temp Pulse Resp BP SpO2   09/20/22 1258 98.3 °F (36.8 °C) 100 16 115/72 98 %         Records Reviewed: Nursing Notes and Old Medical Records      Provider Notes (Medical Decision Making):   DDx: Otitis media, otitis externa, cerumen impaction, ruptured tympanic membrane    This is a 67-year-old male who presents with decreased hearing and sensation of fluid in left ear. On exam, there is evidence of otitis media. It is difficult to assess for perforation due to some bloody/purulent material over the tympanic membrane. Plan to treat with oral antibiotics, will prescribe doxycycline given penicillin allergy. I did advise him to keep the ear dry due to possibility of perforation. Will give ENT referral for further evaluation and management if symptoms persist.    ED Course:   Initial assessment performed. The patients presenting problems have been discussed, and they are in agreement with the care plan formulated and outlined with them. I have encouraged them to ask questions as they arise throughout their visit. Disposition:  2:46 PM    The patient has been re-evaluated and is ready for discharge. Reviewed available results with patient. Counseled patient on diagnosis and care plan. Patient has expressed understanding, and all questions have been answered.  Patient agrees with plan and agrees to follow up as recommended, or to return to the ED if their symptoms worsen. Discharge instructions have been provided and explained to the patient, along with reasons to return to the ED. PLAN:  1. Current Discharge Medication List        START taking these medications    Details   doxycycline (VIBRA-TABS) 100 mg tablet Take 1 Tablet by mouth two (2) times a day for 7 days. Qty: 14 Tablet, Refills: 0  Start date: 9/20/2022, End date: 9/27/2022           2. Follow-up Information       Follow up With Specialties Details Why [facility] DEPT OF OTOLARYNGOLOGY  Call   Xiao Daniels MD Otolaryngology Call   [address] [phone]            Return to ED if worse     Diagnosis     Clinical Impression:   1. Non-recurrent acute suppurative otitis media of left ear without spontaneous rupture of tympanic membrane            Mima Osborn PA-C  09/20/22 2:46 PM

## 2022-09-20 NOTE — ED TRIAGE NOTES
Pt arrived to ED complaining of left ear problem x 2 days. Pt states \"it feels like I have water in my ear\". Pt denies pain.

## 2022-09-20 NOTE — ED NOTES
Emergency Department Nursing Plan of Care       The Nursing Plan of Care is developed from the Nursing assessment and Emergency Department Attending provider initial evaluation. The plan of care may be reviewed in the ED Provider note.     The Plan of Care was developed with the following considerations:   Patient / Family readiness to learn indicated by:verbalized understanding  Persons(s) to be included in education: patient  Barriers to Learning/Limitations:No    Signed     Phi Lehman RN    9/20/2022   2:17 PM